# Patient Record
Sex: FEMALE | Race: BLACK OR AFRICAN AMERICAN | NOT HISPANIC OR LATINO
[De-identification: names, ages, dates, MRNs, and addresses within clinical notes are randomized per-mention and may not be internally consistent; named-entity substitution may affect disease eponyms.]

---

## 2018-05-21 ENCOUNTER — APPOINTMENT (OUTPATIENT)
Dept: INTERNAL MEDICINE | Facility: CLINIC | Age: 63
End: 2018-05-21

## 2019-04-15 ENCOUNTER — APPOINTMENT (OUTPATIENT)
Dept: INTERNAL MEDICINE | Facility: CLINIC | Age: 64
End: 2019-04-15

## 2022-10-10 ENCOUNTER — NON-APPOINTMENT (OUTPATIENT)
Age: 67
End: 2022-10-10

## 2022-10-10 ENCOUNTER — APPOINTMENT (OUTPATIENT)
Dept: INTERNAL MEDICINE | Facility: CLINIC | Age: 67
End: 2022-10-10

## 2022-10-10 VITALS
RESPIRATION RATE: 16 BRPM | TEMPERATURE: 98 F | OXYGEN SATURATION: 99 % | DIASTOLIC BLOOD PRESSURE: 88 MMHG | WEIGHT: 206 LBS | BODY MASS INDEX: 33.11 KG/M2 | HEART RATE: 70 BPM | HEIGHT: 66 IN | SYSTOLIC BLOOD PRESSURE: 158 MMHG

## 2022-10-10 DIAGNOSIS — M19.90 UNSPECIFIED OSTEOARTHRITIS, UNSPECIFIED SITE: ICD-10-CM

## 2022-10-10 DIAGNOSIS — M79.2 NEURALGIA AND NEURITIS, UNSPECIFIED: ICD-10-CM

## 2022-10-10 DIAGNOSIS — Z00.00 ENCOUNTER FOR GENERAL ADULT MEDICAL EXAMINATION W/OUT ABNORMAL FINDINGS: ICD-10-CM

## 2022-10-10 PROCEDURE — 99387 INIT PM E/M NEW PAT 65+ YRS: CPT | Mod: 25

## 2022-10-10 PROCEDURE — 90677 PCV20 VACCINE IM: CPT

## 2022-10-10 PROCEDURE — G0009: CPT

## 2022-10-10 PROCEDURE — 93000 ELECTROCARDIOGRAM COMPLETE: CPT

## 2022-10-10 NOTE — HEALTH RISK ASSESSMENT
[Good] : ~his/her~  mood as  good [Never] : Never [No] : In the past 12 months have you used drugs other than those required for medical reasons? No [No falls in past year] : Patient reported no falls in the past year [0] : 2) Feeling down, depressed, or hopeless: Not at all (0) [Patient reported mammogram was normal] : Patient reported mammogram was normal [Patient declined colonoscopy] : Patient declined colonoscopy [HIV test declined] : HIV test declined [Hepatitis C test offered] : Hepatitis C test offered [None] : None [Feels Safe at Home] : Feels safe at home [Fully functional (bathing, dressing, toileting, transferring, walking, feeding)] : Fully functional (bathing, dressing, toileting, transferring, walking, feeding) [Fully functional (using the telephone, shopping, preparing meals, housekeeping, doing laundry, using] : Fully functional and needs no help or supervision to perform IADLs (using the telephone, shopping, preparing meals, housekeeping, doing laundry, using transportation, managing medications and managing finances) [Smoke Detector] : smoke detector [Carbon Monoxide Detector] : carbon monoxide detector [Seat Belt] :  uses seat belt [Sunscreen] : uses sunscreen [With Patient/Caregiver] : , with patient/caregiver [FreeTextEntry1] : Check up\par  [de-identified] : None [[id number]] : 0 [Change in mental status noted] : No change in mental status noted [Reports changes in hearing] : Reports no changes in hearing [Reports changes in vision] : Reports no changes in vision [Reports changes in dental health] : Reports no changes in dental health [MammogramDate] : 02/16 [MammogramComments] : As ordered for today. [BoneDensityComments] : As ordered for today. [AdvancecareDate] : 10/22

## 2022-10-10 NOTE — ASSESSMENT
[FreeTextEntry1] : 67 year old female found to have stable Essential Hypertension, Hypercholesterolemia, Elevated Hemoglobin A1c, Microalbuminuria, Anemia, Eczema,with the current prescription regimen as recommended, diet and life style modifications, as counseled. Prior results reviewed, interpreted and discussed with the patient during today's examination, as appropriate. Follow up, treatment plan and tests, as ordered.\par \par EKG:\par Sinus Rhythm @ 67 BPM.\par Known LAD, no new ST-T changes noted [FreeTextEntry3] : low-cholesterol diet, no concentrated sugar diet as instructed.

## 2022-10-10 NOTE — HISTORY OF PRESENT ILLNESS
[de-identified] : 67 year old female patient with history of stable Essential Hypertension, Hypercholesterolemia, Elevated Hemoglobin A1c, Microalbuminuria, Anemia, Eczema, history as stated, presented for an annual preventative examination.\par Patient denies any associated symptoms of shortness of breath, chest pain, abdominal pain at this time.\par

## 2022-10-14 LAB
ALBUMIN SERPL ELPH-MCNC: 4.8 G/DL
ALP BLD-CCNC: 59 U/L
ALT SERPL-CCNC: 16 U/L
AMPHET UR-MCNC: NEGATIVE
ANION GAP SERPL CALC-SCNC: 12 MMOL/L
APPEARANCE: CLEAR
AST SERPL-CCNC: 31 U/L
BACTERIA: NEGATIVE
BARBITURATES UR-MCNC: NEGATIVE
BASOPHILS # BLD AUTO: 0.04 K/UL
BASOPHILS NFR BLD AUTO: 0.7 %
BENZODIAZ UR-MCNC: NEGATIVE
BILIRUB SERPL-MCNC: 0.2 MG/DL
BILIRUBIN URINE: NEGATIVE
BLOOD URINE: NEGATIVE
BUN SERPL-MCNC: 19 MG/DL
CALCIUM SERPL-MCNC: 9.3 MG/DL
CHLORIDE SERPL-SCNC: 104 MMOL/L
CHOLEST SERPL-MCNC: 204 MG/DL
CO2 SERPL-SCNC: 26 MMOL/L
COCAINE METAB.OTHER UR-MCNC: NEGATIVE
COLOR: NORMAL
CREAT SERPL-MCNC: 0.99 MG/DL
CREAT SPEC-SCNC: 95 MG/DL
CREATININE, URINE: 91 MG/DL
EGFR: 63 ML/MIN/1.73M2
EOSINOPHIL # BLD AUTO: 0.14 K/UL
EOSINOPHIL NFR BLD AUTO: 2.5 %
ESTIMATED AVERAGE GLUCOSE: 140 MG/DL
FOLATE SERPL-MCNC: >20 NG/ML
GGT SERPL-CCNC: 13 U/L
GLUCOSE QUALITATIVE U: NEGATIVE
GLUCOSE SERPL-MCNC: 78 MG/DL
HBA1C MFR BLD HPLC: 6.5 %
HCT VFR BLD CALC: 32.1 %
HCV AB SER QL: NONREACTIVE
HCV S/CO RATIO: 0.13 S/CO
HDLC SERPL-MCNC: 101 MG/DL
HGB BLD-MCNC: 10 G/DL
HYALINE CASTS: 3 /LPF
IMM GRANULOCYTES NFR BLD AUTO: 0.2 %
IRON SATN MFR SERPL: 13 %
IRON SERPL-MCNC: 53 UG/DL
KETONES URINE: NEGATIVE
LDLC SERPL CALC-MCNC: 92 MG/DL
LEUKOCYTE ESTERASE URINE: ABNORMAL
LYMPHOCYTES # BLD AUTO: 1.81 K/UL
LYMPHOCYTES NFR BLD AUTO: 32.8 %
M TB IFN-G BLD-IMP: NEGATIVE
MAN DIFF?: NORMAL
MCHC RBC-ENTMCNC: 26.7 PG
MCHC RBC-ENTMCNC: 31.2 GM/DL
MCV RBC AUTO: 85.6 FL
METHADONE UR-MCNC: NEGATIVE
METHAQUALONE UR-MCNC: NEGATIVE
MEV IGG FLD QL IA: >300 AU/ML
MEV IGG+IGM SER-IMP: POSITIVE
MICROALBUMIN 24H UR DL<=1MG/L-MCNC: 16.2 MG/DL
MICROALBUMIN/CREAT 24H UR-RTO: 171 MG/G
MICROSCOPIC-UA: NORMAL
MONOCYTES # BLD AUTO: 0.47 K/UL
MONOCYTES NFR BLD AUTO: 8.5 %
MUV AB SER-ACNC: POSITIVE
MUV IGG SER QL IA: 72.9 AU/ML
NEUTROPHILS # BLD AUTO: 3.05 K/UL
NEUTROPHILS NFR BLD AUTO: 55.3 %
NITRITE URINE: NEGATIVE
NONHDLC SERPL-MCNC: 103 MG/DL
OPIATES UR-MCNC: NEGATIVE
PCP UR-MCNC: NEGATIVE
PH URINE: 6.5
PLATELET # BLD AUTO: 323 K/UL
POTASSIUM SERPL-SCNC: 4.8 MMOL/L
PROPOXYPH UR QL: NEGATIVE
PROT SERPL-MCNC: 7.4 G/DL
PROTEIN URINE: ABNORMAL
QUANTIFERON TB PLUS MITOGEN MINUS NIL: >10 IU/ML
QUANTIFERON TB PLUS NIL: 0.05 IU/ML
QUANTIFERON TB PLUS TB1 MINUS NIL: 0.02 IU/ML
QUANTIFERON TB PLUS TB2 MINUS NIL: 0.01 IU/ML
RBC # BLD: 3.75 M/UL
RBC # FLD: 17.3 %
RED BLOOD CELLS URINE: 1 /HPF
RUBV IGG FLD-ACNC: 20.1 INDEX
RUBV IGG SER-IMP: POSITIVE
SODIUM SERPL-SCNC: 143 MMOL/L
SPECIFIC GRAVITY URINE: 1.01
SQUAMOUS EPITHELIAL CELLS: 5 /HPF
THC UR QL: NEGATIVE
TIBC SERPL-MCNC: 399 UG/DL
TRIGL SERPL-MCNC: 54 MG/DL
TSH SERPL-ACNC: 0.73 UIU/ML
UIBC SERPL-MCNC: 346 UG/DL
UROBILINOGEN URINE: NORMAL
VIT B12 SERPL-MCNC: 1616 PG/ML
VZV AB TITR SER: POSITIVE
VZV IGG SER IF-ACNC: 959.1 INDEX
WBC # FLD AUTO: 5.52 K/UL
WHITE BLOOD CELLS URINE: 20 /HPF

## 2022-10-15 LAB
HGB A MFR BLD: 62.7 %
HGB A2 MFR BLD: 3.3 %
HGB FRACT BLD-IMP: NORMAL
HGB S BLD QL SOLY: POSITIVE
HGB S MFR BLD: 34 %

## 2022-11-09 LAB — HEMOCCULT STL QL IA: NEGATIVE

## 2023-01-09 ENCOUNTER — APPOINTMENT (OUTPATIENT)
Dept: INTERNAL MEDICINE | Facility: CLINIC | Age: 68
End: 2023-01-09

## 2023-01-16 ENCOUNTER — RX ONLY (RX ONLY)
Age: 68
End: 2023-01-16

## 2023-01-16 ENCOUNTER — OFFICE (OUTPATIENT)
Dept: URBAN - METROPOLITAN AREA CLINIC 28 | Facility: CLINIC | Age: 68
Setting detail: OPHTHALMOLOGY
End: 2023-01-16
Payer: COMMERCIAL

## 2023-01-16 VITALS — WEIGHT: 198 LBS | HEIGHT: 66 IN

## 2023-01-16 DIAGNOSIS — H40.1111: ICD-10-CM

## 2023-01-16 DIAGNOSIS — T15.01XA: ICD-10-CM

## 2023-01-16 DIAGNOSIS — H40.1122: ICD-10-CM

## 2023-01-16 DIAGNOSIS — H25.13: ICD-10-CM

## 2023-01-16 DIAGNOSIS — H16.223: ICD-10-CM

## 2023-01-16 PROCEDURE — 65222 REMOVE FOREIGN BODY FROM EYE: CPT | Performed by: OPHTHALMOLOGY

## 2023-01-16 PROCEDURE — 92083 EXTENDED VISUAL FIELD XM: CPT | Performed by: OPHTHALMOLOGY

## 2023-01-16 PROCEDURE — 92020 GONIOSCOPY: CPT | Performed by: OPHTHALMOLOGY

## 2023-01-16 PROCEDURE — 92012 INTRM OPH EXAM EST PATIENT: CPT | Performed by: OPHTHALMOLOGY

## 2023-01-16 ASSESSMENT — AXIALLENGTH_DERIVED
OS_AL: 24.1022
OS_AL: 24.5158
OD_AL: 23.7953
OD_AL: 23.8948

## 2023-01-16 ASSESSMENT — KERATOMETRY
OD_K2POWER_DIOPTERS: 42.00
OS_K1POWER_DIOPTERS: 41.50
OS_AXISANGLE_DEGREES: 139
OS_K2POWER_DIOPTERS: 42.25
OD_AXISANGLE_DEGREES: 019
OD_K1POWER_DIOPTERS: 41.25

## 2023-01-16 ASSESSMENT — REFRACTION_CURRENTRX
OD_CYLINDER: -0.50
OD_ADD: +2.50
OD_VPRISM_DIRECTION: PROGS
OS_VPRISM_DIRECTION: PROGS
OS_ADD: +2.50
OS_CYLINDER: -1.00
OD_AXIS: 103
OD_OVR_VA: 20/
OD_SPHERE: +1.50
OS_SPHERE: +0.25
OS_OVR_VA: 20/
OS_AXIS: 084

## 2023-01-16 ASSESSMENT — REFRACTION_MANIFEST
OD_SPHERE: +1.50
OD_ADD: +2.50
OS_ADD: +2.50
OS_SPHERE: -0.25
OS_VA1: 20/30+
OD_AXIS: 100
OS_AXIS: 090
OD_VA1: 20/25+
OS_CYLINDER: -1.00
OD_CYLINDER: -1.00

## 2023-01-16 ASSESSMENT — SPHEQUIV_DERIVED
OS_SPHEQUIV: 0.25
OD_SPHEQUIV: 1.25
OD_SPHEQUIV: 1
OS_SPHEQUIV: -0.75

## 2023-01-16 ASSESSMENT — VISUAL ACUITY: OS_BCVA: 20/30-2

## 2023-01-16 ASSESSMENT — PACHYMETRY
OD_CT_UM: 523
OS_CT_UM: 528
OS_CT_CORRECTION: 1
OD_CT_CORRECTION: 1

## 2023-01-16 ASSESSMENT — CONFRONTATIONAL VISUAL FIELD TEST (CVF)
OS_FINDINGS: FULL
OD_FINDINGS: FULL

## 2023-01-16 ASSESSMENT — CORNEAL PTERYGIUM: OS_PTERYGIUM: NASAL

## 2023-01-16 ASSESSMENT — REFRACTION_AUTOREFRACTION
OS_CYLINDER: -1.50
OD_AXIS: 095
OS_SPHERE: +1.00
OD_CYLINDER: -1.50
OD_SPHERE: +2.00
OS_AXIS: 083

## 2023-01-16 ASSESSMENT — CORNEAL TRAUMA - FOREIGN BODY: OD_FOREIGNBODY: METALLIC W/RUST RUN PRESENT

## 2023-01-16 ASSESSMENT — LID EXAM ASSESSMENTS
OS_MEIBOMITIS: T
OD_MEIBOMITIS: T

## 2023-01-16 ASSESSMENT — TEAR BREAK UP TIME (TBUT)
OS_TBUT: T
OD_TBUT: T

## 2023-01-16 ASSESSMENT — TONOMETRY
OS_IOP_MMHG: 11
OD_IOP_MMHG: 10

## 2023-02-08 ENCOUNTER — APPOINTMENT (OUTPATIENT)
Dept: NEUROLOGY | Facility: CLINIC | Age: 68
End: 2023-02-08

## 2023-02-13 ENCOUNTER — OFFICE (OUTPATIENT)
Dept: URBAN - METROPOLITAN AREA CLINIC 28 | Facility: CLINIC | Age: 68
Setting detail: OPHTHALMOLOGY
End: 2023-02-13
Payer: COMMERCIAL

## 2023-02-13 DIAGNOSIS — H25.13: ICD-10-CM

## 2023-02-13 DIAGNOSIS — H11.042: ICD-10-CM

## 2023-02-13 DIAGNOSIS — T15.01XD: ICD-10-CM

## 2023-02-13 DIAGNOSIS — H16.223: ICD-10-CM

## 2023-02-13 DIAGNOSIS — H40.1111: ICD-10-CM

## 2023-02-13 DIAGNOSIS — H40.1122: ICD-10-CM

## 2023-02-13 PROCEDURE — 92012 INTRM OPH EXAM EST PATIENT: CPT | Performed by: OPHTHALMOLOGY

## 2023-02-13 ASSESSMENT — REFRACTION_MANIFEST
OS_CYLINDER: -1.00
OS_SPHERE: -0.25
OS_ADD: +2.50
OD_AXIS: 100
OD_CYLINDER: -1.00
OD_SPHERE: +1.50
OD_ADD: +2.50
OS_VA1: 20/30+
OS_AXIS: 090
OD_VA1: 20/25+

## 2023-02-13 ASSESSMENT — REFRACTION_CURRENTRX
OS_ADD: +2.50
OD_OVR_VA: 20/
OD_SPHERE: +1.50
OS_VPRISM_DIRECTION: PROGS
OD_CYLINDER: -0.50
OS_OVR_VA: 20/
OD_VPRISM_DIRECTION: PROGS
OS_CYLINDER: -1.00
OD_ADD: +2.50
OS_SPHERE: +0.25
OS_AXIS: 084
OD_AXIS: 103

## 2023-02-13 ASSESSMENT — REFRACTION_AUTOREFRACTION
OD_CYLINDER: -1.00
OS_CYLINDER: -1.25
OD_SPHERE: +1.50
OS_SPHERE: +0.25
OS_AXIS: 087
OD_AXIS: 102

## 2023-02-13 ASSESSMENT — SPHEQUIV_DERIVED
OS_SPHEQUIV: -0.375
OD_SPHEQUIV: 1
OD_SPHEQUIV: 1
OS_SPHEQUIV: -0.75

## 2023-02-13 ASSESSMENT — KERATOMETRY
OS_AXISANGLE_DEGREES: 152
OS_K1POWER_DIOPTERS: 41.75
OD_AXISANGLE_DEGREES: 147
OS_K2POWER_DIOPTERS: 42.25
OD_K2POWER_DIOPTERS: 43.75
OD_K1POWER_DIOPTERS: 43.51

## 2023-02-13 ASSESSMENT — AXIALLENGTH_DERIVED
OD_AL: 23.16
OD_AL: 23.16
OS_AL: 24.3102
OS_AL: 24.4663

## 2023-02-13 ASSESSMENT — PACHYMETRY
OD_CT_UM: 523
OS_CT_UM: 528
OS_CT_CORRECTION: 1
OD_CT_CORRECTION: 1

## 2023-02-13 ASSESSMENT — SUPERFICIAL PUNCTATE KERATITIS (SPK)
OD_SPK: 1+
OS_SPK: 1+

## 2023-02-13 ASSESSMENT — TONOMETRY
OS_IOP_MMHG: 15
OD_IOP_MMHG: 14

## 2023-02-13 ASSESSMENT — LID EXAM ASSESSMENTS
OD_MEIBOMITIS: T
OS_MEIBOMITIS: T

## 2023-02-13 ASSESSMENT — CORNEAL PTERYGIUM: OS_PTERYGIUM: NASAL

## 2023-02-13 ASSESSMENT — CORNEAL TRAUMA - FOREIGN BODY: OD_FOREIGNBODY: ABSENT

## 2023-02-13 ASSESSMENT — CORNEAL SURGICAL SCARRING: OD_SCARRING: ANTERIOR STROMAL

## 2023-02-13 ASSESSMENT — VISUAL ACUITY
OS_BCVA: 20/30
OD_BCVA: 20/20

## 2023-02-13 ASSESSMENT — TEAR BREAK UP TIME (TBUT)
OS_TBUT: T
OD_TBUT: T

## 2023-02-20 ENCOUNTER — OFFICE (OUTPATIENT)
Dept: URBAN - METROPOLITAN AREA CLINIC 28 | Facility: CLINIC | Age: 68
Setting detail: OPHTHALMOLOGY
End: 2023-02-20
Payer: COMMERCIAL

## 2023-02-20 DIAGNOSIS — H40.1122: ICD-10-CM

## 2023-02-20 DIAGNOSIS — H16.223: ICD-10-CM

## 2023-02-20 DIAGNOSIS — H40.1111: ICD-10-CM

## 2023-02-20 DIAGNOSIS — H11.042: ICD-10-CM

## 2023-02-20 DIAGNOSIS — H25.13: ICD-10-CM

## 2023-02-20 PROBLEM — T15.01XD CORNEAL FOREIGN BODY; RIGHT EYE SUBSEQUENT ENCOUNTER: Status: RESOLVED | Noted: 2023-02-13 | Resolved: 2023-02-20

## 2023-02-20 PROCEDURE — 92012 INTRM OPH EXAM EST PATIENT: CPT | Performed by: OPHTHALMOLOGY

## 2023-02-20 PROCEDURE — 66030 INJECTION TREATMENT OF EYE: CPT | Performed by: OPHTHALMOLOGY

## 2023-02-20 ASSESSMENT — SUPERFICIAL PUNCTATE KERATITIS (SPK)
OS_SPK: 1+
OD_SPK: 1+

## 2023-02-20 ASSESSMENT — PACHYMETRY
OD_CT_CORRECTION: 1
OS_CT_CORRECTION: 1
OD_CT_UM: 523
OS_CT_UM: 528

## 2023-02-20 ASSESSMENT — CORNEAL PTERYGIUM: OS_PTERYGIUM: NASAL

## 2023-02-20 ASSESSMENT — CONFRONTATIONAL VISUAL FIELD TEST (CVF)
OD_FINDINGS: FULL
OS_FINDINGS: FULL

## 2023-02-20 ASSESSMENT — TONOMETRY
OD_IOP_MMHG: 12
OS_IOP_MMHG: 12

## 2023-02-20 ASSESSMENT — LID EXAM ASSESSMENTS
OD_MEIBOMITIS: T
OS_MEIBOMITIS: T

## 2023-02-20 ASSESSMENT — CORNEAL SURGICAL SCARRING: OD_SCARRING: ANTERIOR STROMAL

## 2023-02-20 ASSESSMENT — TEAR BREAK UP TIME (TBUT)
OD_TBUT: T
OS_TBUT: T

## 2023-02-20 ASSESSMENT — CORNEAL TRAUMA - FOREIGN BODY: OD_FOREIGNBODY: ABSENT

## 2023-02-21 ASSESSMENT — REFRACTION_CURRENTRX
OD_VPRISM_DIRECTION: PROGS
OD_AXIS: 103
OD_ADD: +2.50
OS_ADD: +2.50
OS_SPHERE: +0.25
OD_CYLINDER: -0.50
OS_OVR_VA: 20/
OS_AXIS: 084
OS_CYLINDER: -1.00
OD_OVR_VA: 20/
OD_SPHERE: +1.50
OS_VPRISM_DIRECTION: PROGS

## 2023-02-21 ASSESSMENT — AXIALLENGTH_DERIVED
OS_AL: 24.4663
OD_AL: 23.16
OS_AL: 24.3102
OD_AL: 23.16

## 2023-02-21 ASSESSMENT — REFRACTION_MANIFEST
OS_SPHERE: -0.25
OD_CYLINDER: -1.00
OD_SPHERE: +1.50
OD_ADD: +2.50
OD_AXIS: 100
OD_VA1: 20/25+
OS_ADD: +2.50
OS_VA1: 20/30+
OS_AXIS: 090
OS_CYLINDER: -1.00

## 2023-02-21 ASSESSMENT — REFRACTION_AUTOREFRACTION
OD_CYLINDER: -1.00
OS_AXIS: 087
OD_SPHERE: +1.50
OS_CYLINDER: -1.25
OS_SPHERE: +0.25
OD_AXIS: 102

## 2023-02-21 ASSESSMENT — SPHEQUIV_DERIVED
OS_SPHEQUIV: -0.375
OD_SPHEQUIV: 1
OS_SPHEQUIV: -0.75
OD_SPHEQUIV: 1

## 2023-02-21 ASSESSMENT — KERATOMETRY
OS_K1POWER_DIOPTERS: 41.75
OS_AXISANGLE_DEGREES: 152
OD_K2POWER_DIOPTERS: 43.75
OD_K1POWER_DIOPTERS: 43.51
OS_K2POWER_DIOPTERS: 42.25
OD_AXISANGLE_DEGREES: 147

## 2023-02-21 ASSESSMENT — VISUAL ACUITY
OS_BCVA: 20/20-2
OD_BCVA: 20/20-2

## 2023-02-27 ENCOUNTER — OFFICE (OUTPATIENT)
Dept: URBAN - METROPOLITAN AREA CLINIC 28 | Facility: CLINIC | Age: 68
Setting detail: OPHTHALMOLOGY
End: 2023-02-27
Payer: COMMERCIAL

## 2023-02-27 DIAGNOSIS — H40.1111: ICD-10-CM

## 2023-02-27 DIAGNOSIS — H25.13: ICD-10-CM

## 2023-02-27 DIAGNOSIS — H16.223: ICD-10-CM

## 2023-02-27 DIAGNOSIS — H11.042: ICD-10-CM

## 2023-02-27 PROCEDURE — 66030 INJECTION TREATMENT OF EYE: CPT | Performed by: OPHTHALMOLOGY

## 2023-02-27 PROCEDURE — 92012 INTRM OPH EXAM EST PATIENT: CPT | Performed by: OPHTHALMOLOGY

## 2023-02-27 ASSESSMENT — LID EXAM ASSESSMENTS
OD_MEIBOMITIS: T
OS_MEIBOMITIS: T

## 2023-02-27 ASSESSMENT — CORNEAL PTERYGIUM: OS_PTERYGIUM: NASAL

## 2023-02-27 ASSESSMENT — SUPERFICIAL PUNCTATE KERATITIS (SPK)
OS_SPK: T
OD_SPK: T

## 2023-02-27 ASSESSMENT — TEAR BREAK UP TIME (TBUT)
OS_TBUT: T
OD_TBUT: T

## 2023-02-27 ASSESSMENT — CONFRONTATIONAL VISUAL FIELD TEST (CVF)
OS_FINDINGS: FULL
OD_FINDINGS: FULL

## 2023-02-27 ASSESSMENT — CORNEAL TRAUMA - FOREIGN BODY: OD_FOREIGNBODY: ABSENT

## 2023-02-27 ASSESSMENT — CORNEAL SURGICAL SCARRING: OD_SCARRING: ANTERIOR STROMAL

## 2023-02-28 ASSESSMENT — REFRACTION_CURRENTRX
OD_SPHERE: +1.50
OS_AXIS: 084
OS_SPHERE: +0.25
OD_CYLINDER: -0.50
OD_AXIS: 103
OD_OVR_VA: 20/
OS_OVR_VA: 20/
OS_CYLINDER: -1.00
OD_VPRISM_DIRECTION: PROGS
OD_ADD: +2.50
OS_VPRISM_DIRECTION: PROGS
OS_ADD: +2.50

## 2023-02-28 ASSESSMENT — REFRACTION_MANIFEST
OD_ADD: +2.50
OS_CYLINDER: -1.00
OS_ADD: +2.50
OS_SPHERE: -0.25
OD_CYLINDER: -1.00
OD_VA1: 20/25+
OD_SPHERE: +1.50
OS_VA1: 20/30+
OD_AXIS: 100
OS_AXIS: 090

## 2023-02-28 ASSESSMENT — SPHEQUIV_DERIVED
OD_SPHEQUIV: 1
OS_SPHEQUIV: -0.75
OD_SPHEQUIV: 1
OS_SPHEQUIV: -0.375

## 2023-02-28 ASSESSMENT — REFRACTION_AUTOREFRACTION
OS_SPHERE: +0.25
OS_AXIS: 087
OS_CYLINDER: -1.25
OD_SPHERE: +1.50
OD_AXIS: 102
OD_CYLINDER: -1.00

## 2023-02-28 ASSESSMENT — VISUAL ACUITY
OD_BCVA: 20/20-2
OS_BCVA: 20/20-2

## 2023-04-10 ENCOUNTER — OFFICE (OUTPATIENT)
Dept: URBAN - METROPOLITAN AREA CLINIC 28 | Facility: CLINIC | Age: 68
Setting detail: OPHTHALMOLOGY
End: 2023-04-10
Payer: COMMERCIAL

## 2023-04-10 ENCOUNTER — RX ONLY (RX ONLY)
Age: 68
End: 2023-04-10

## 2023-04-10 DIAGNOSIS — H11.042: ICD-10-CM

## 2023-04-10 DIAGNOSIS — H16.223: ICD-10-CM

## 2023-04-10 DIAGNOSIS — H25.13: ICD-10-CM

## 2023-04-10 DIAGNOSIS — H40.1122: ICD-10-CM

## 2023-04-10 DIAGNOSIS — H40.1111: ICD-10-CM

## 2023-04-10 PROCEDURE — 92014 COMPRE OPH EXAM EST PT 1/>: CPT | Performed by: OPHTHALMOLOGY

## 2023-04-10 PROCEDURE — 92133 CPTRZD OPH DX IMG PST SGM ON: CPT | Performed by: OPHTHALMOLOGY

## 2023-04-10 ASSESSMENT — KERATOMETRY
OD_AXISANGLE_DEGREES: 023
OS_AXISANGLE_DEGREES: 141
OD_K1POWER_DIOPTERS: 41.25
OS_K1POWER_DIOPTERS: 41.25
OS_K2POWER_DIOPTERS: 42.00
OD_K2POWER_DIOPTERS: 41.75

## 2023-04-10 ASSESSMENT — LID EXAM ASSESSMENTS
OS_MEIBOMITIS: T
OD_MEIBOMITIS: T

## 2023-04-10 ASSESSMENT — AXIALLENGTH_DERIVED
OD_AL: 23.9923
OS_AL: 24.3531
OS_AL: 24.6154
OD_AL: 23.942

## 2023-04-10 ASSESSMENT — REFRACTION_CURRENTRX
OD_AXIS: 103
OS_AXIS: 084
OD_SPHERE: +1.50
OD_CYLINDER: -0.50
OS_OVR_VA: 20/
OD_OVR_VA: 20/
OS_SPHERE: +0.25
OD_VPRISM_DIRECTION: PROGS
OD_ADD: +2.50
OS_ADD: +2.50
OS_VPRISM_DIRECTION: PROGS
OS_CYLINDER: -1.00

## 2023-04-10 ASSESSMENT — SPHEQUIV_DERIVED
OS_SPHEQUIV: -0.75
OS_SPHEQUIV: -0.125
OD_SPHEQUIV: 1
OD_SPHEQUIV: 0.875

## 2023-04-10 ASSESSMENT — REFRACTION_MANIFEST
OD_VA1: 20/25+
OS_CYLINDER: -1.00
OD_ADD: +2.50
OD_CYLINDER: -1.00
OD_SPHERE: +1.50
OS_AXIS: 090
OS_SPHERE: -0.25
OD_AXIS: 100
OS_VA1: 20/30+
OS_ADD: +2.50

## 2023-04-10 ASSESSMENT — VISUAL ACUITY
OS_BCVA: 20/20-2
OD_BCVA: 20/20-2

## 2023-04-10 ASSESSMENT — REFRACTION_AUTOREFRACTION
OS_CYLINDER: -1.75
OS_SPHERE: +0.75
OD_AXIS: 098
OD_SPHERE: +1.25
OD_CYLINDER: -0.75
OS_AXIS: 077

## 2023-04-10 ASSESSMENT — TEAR BREAK UP TIME (TBUT)
OD_TBUT: T
OS_TBUT: T

## 2023-04-10 ASSESSMENT — PACHYMETRY
OD_CT_UM: 523
OD_CT_CORRECTION: 1
OS_CT_CORRECTION: 1
OS_CT_UM: 528

## 2023-04-10 ASSESSMENT — TONOMETRY
OS_IOP_MMHG: 12
OD_IOP_MMHG: 10

## 2023-04-10 ASSESSMENT — SUPERFICIAL PUNCTATE KERATITIS (SPK)
OD_SPK: T
OS_SPK: T

## 2023-04-10 ASSESSMENT — CORNEAL PTERYGIUM: OS_PTERYGIUM: NASAL

## 2023-04-10 ASSESSMENT — CONFRONTATIONAL VISUAL FIELD TEST (CVF)
OS_FINDINGS: FULL
OD_FINDINGS: FULL

## 2023-04-10 ASSESSMENT — CORNEAL SURGICAL SCARRING: OD_SCARRING: ANTERIOR STROMAL

## 2023-07-13 ENCOUNTER — Encounter (OUTPATIENT)
Dept: URBAN - METROPOLITAN AREA CLINIC 28 | Facility: CLINIC | Age: 68
Setting detail: OPHTHALMOLOGY
End: 2023-07-13
Payer: MEDICARE

## 2023-07-22 ENCOUNTER — RX RENEWAL (OUTPATIENT)
Age: 68
End: 2023-07-22

## 2023-07-24 ENCOUNTER — OFFICE (OUTPATIENT)
Dept: URBAN - METROPOLITAN AREA CLINIC 28 | Facility: CLINIC | Age: 68
Setting detail: OPHTHALMOLOGY
End: 2023-07-24
Payer: MEDICARE

## 2023-07-24 DIAGNOSIS — H40.1122: ICD-10-CM

## 2023-07-24 DIAGNOSIS — H40.1111: ICD-10-CM

## 2023-07-24 DIAGNOSIS — H11.042: ICD-10-CM

## 2023-07-24 DIAGNOSIS — H25.13: ICD-10-CM

## 2023-07-24 PROCEDURE — 92012 INTRM OPH EXAM EST PATIENT: CPT | Performed by: OPHTHALMOLOGY

## 2023-07-24 ASSESSMENT — REFRACTION_MANIFEST
OS_AXIS: 090
OD_SPHERE: +1.50
OD_SPHERE: +1.50
OD_AXIS: 100
OS_VA1: 20/30+
OS_ADD: +2.50
OS_SPHERE: -0.25
OD_VA1: 20/25
OD_ADD: +2.50
OD_CYLINDER: -0.50
OS_CYLINDER: -1.00
OD_AXIS: 100
OD_CYLINDER: -1.00
OD_VA1: 20/25+

## 2023-07-24 ASSESSMENT — LID EXAM ASSESSMENTS
OS_MEIBOMITIS: T
OD_MEIBOMITIS: T

## 2023-07-24 ASSESSMENT — AXIALLENGTH_DERIVED
OS_AL: 24.3531
OD_AL: 23.9923
OS_AL: 24.6154
OD_AL: 23.942
OD_AL: 23.8421

## 2023-07-24 ASSESSMENT — REFRACTION_AUTOREFRACTION
OD_AXIS: 098
OS_AXIS: 077
OS_CYLINDER: -1.75
OD_SPHERE: +1.25
OD_CYLINDER: -0.75
OS_SPHERE: +0.75

## 2023-07-24 ASSESSMENT — REFRACTION_CURRENTRX
OS_OVR_VA: 20/
OS_AXIS: 084
OD_CYLINDER: -0.50
OS_VPRISM_DIRECTION: PROGS
OS_CYLINDER: -1.00
OD_OVR_VA: 20/
OD_ADD: +2.50
OD_SPHERE: +1.50
OD_VPRISM_DIRECTION: PROGS
OS_SPHERE: +0.25
OS_ADD: +2.50
OD_AXIS: 103

## 2023-07-24 ASSESSMENT — SPHEQUIV_DERIVED
OS_SPHEQUIV: -0.75
OD_SPHEQUIV: 1.25
OD_SPHEQUIV: 0.875
OD_SPHEQUIV: 1
OS_SPHEQUIV: -0.125

## 2023-07-24 ASSESSMENT — SUPERFICIAL PUNCTATE KERATITIS (SPK)
OS_SPK: T
OD_SPK: T

## 2023-07-24 ASSESSMENT — VISUAL ACUITY
OD_BCVA: 20/25-2
OS_BCVA: 20/25-2

## 2023-07-24 ASSESSMENT — CORNEAL EDEMA - MICROCYSTIC EPITHELIAL EDEMA (MCE): OD_MCE: T

## 2023-07-24 ASSESSMENT — CORNEAL SURGICAL SCARRING: OD_SCARRING: ANTERIOR STROMAL

## 2023-07-24 ASSESSMENT — PACHYMETRY
OS_CT_CORRECTION: 1
OS_CT_UM: 528
OD_CT_UM: 523
OD_CT_CORRECTION: 1

## 2023-07-24 ASSESSMENT — TONOMETRY
OS_IOP_MMHG: 15
OD_IOP_MMHG: 13

## 2023-07-24 ASSESSMENT — TEAR BREAK UP TIME (TBUT)
OS_TBUT: T
OD_TBUT: T

## 2023-07-24 ASSESSMENT — KERATOMETRY
OS_AXISANGLE_DEGREES: 141
OD_AXISANGLE_DEGREES: 023
OS_K2POWER_DIOPTERS: 42.00
OD_K1POWER_DIOPTERS: 41.25
OD_K2POWER_DIOPTERS: 41.75
OS_K1POWER_DIOPTERS: 41.25

## 2023-07-24 ASSESSMENT — CORNEAL PTERYGIUM: OS_PTERYGIUM: NASAL

## 2023-08-23 ENCOUNTER — OFFICE (OUTPATIENT)
Dept: URBAN - METROPOLITAN AREA CLINIC 28 | Facility: CLINIC | Age: 68
Setting detail: OPHTHALMOLOGY
End: 2023-08-23
Payer: MEDICARE

## 2023-08-23 DIAGNOSIS — H40.1111: ICD-10-CM

## 2023-08-23 DIAGNOSIS — H25.13: ICD-10-CM

## 2023-08-23 DIAGNOSIS — H40.1122: ICD-10-CM

## 2023-08-23 PROCEDURE — 92012 INTRM OPH EXAM EST PATIENT: CPT | Performed by: OPHTHALMOLOGY

## 2023-08-23 ASSESSMENT — PACHYMETRY
OS_CT_CORRECTION: 1
OD_CT_UM: 523
OS_CT_UM: 528
OD_CT_CORRECTION: 1

## 2023-08-23 ASSESSMENT — CORNEAL SURGICAL SCARRING: OD_SCARRING: ANTERIOR STROMAL

## 2023-08-23 ASSESSMENT — TEAR BREAK UP TIME (TBUT)
OS_TBUT: T
OD_TBUT: T

## 2023-08-23 ASSESSMENT — SUPERFICIAL PUNCTATE KERATITIS (SPK)
OD_SPK: T
OS_SPK: T

## 2023-08-23 ASSESSMENT — REFRACTION_MANIFEST
OS_ADD: +2.50
OD_CYLINDER: -1.00
OD_SPHERE: +1.50
OS_SPHERE: -0.25
OD_SPHERE: +1.50
OD_AXIS: 100
OS_VA1: 20/30+
OS_AXIS: 090
OD_VA1: 20/25
OD_CYLINDER: -0.50
OD_ADD: +2.50
OD_AXIS: 100
OD_VA1: 20/25+
OS_CYLINDER: -1.00

## 2023-08-23 ASSESSMENT — REFRACTION_CURRENTRX
OD_AXIS: 103
OS_AXIS: 084
OS_ADD: +2.50
OD_CYLINDER: -0.50
OS_VPRISM_DIRECTION: PROGS
OS_SPHERE: +0.25
OD_SPHERE: +1.50
OD_OVR_VA: 20/
OS_CYLINDER: -1.00
OD_ADD: +2.50
OS_OVR_VA: 20/
OD_VPRISM_DIRECTION: PROGS

## 2023-08-23 ASSESSMENT — SPHEQUIV_DERIVED
OD_SPHEQUIV: 1.25
OD_SPHEQUIV: 0.875
OS_SPHEQUIV: -0.75
OD_SPHEQUIV: 1
OS_SPHEQUIV: -0.125

## 2023-08-23 ASSESSMENT — TONOMETRY
OD_IOP_MMHG: 15
OS_IOP_MMHG: 14

## 2023-08-23 ASSESSMENT — AXIALLENGTH_DERIVED
OD_AL: 23.9923
OS_AL: 24.3531
OD_AL: 23.8421
OS_AL: 24.6154
OD_AL: 23.942

## 2023-08-23 ASSESSMENT — KERATOMETRY
OS_K1POWER_DIOPTERS: 41.25
OD_K1POWER_DIOPTERS: 41.25
OS_AXISANGLE_DEGREES: 141
OD_AXISANGLE_DEGREES: 023
OD_K2POWER_DIOPTERS: 41.75
OS_K2POWER_DIOPTERS: 42.00

## 2023-08-23 ASSESSMENT — REFRACTION_AUTOREFRACTION
OS_CYLINDER: -1.75
OS_SPHERE: +0.75
OS_AXIS: 077
OD_AXIS: 098
OD_CYLINDER: -0.75
OD_SPHERE: +1.25

## 2023-08-23 ASSESSMENT — CONFRONTATIONAL VISUAL FIELD TEST (CVF)
OS_FINDINGS: FULL
OD_FINDINGS: FULL

## 2023-08-23 ASSESSMENT — VISUAL ACUITY
OD_BCVA: 20/25-2
OS_BCVA: 20/25-2

## 2023-08-23 ASSESSMENT — CORNEAL EDEMA - MICROCYSTIC EPITHELIAL EDEMA (MCE): OD_MCE: T

## 2023-08-23 ASSESSMENT — LID EXAM ASSESSMENTS
OD_MEIBOMITIS: T
OS_MEIBOMITIS: T

## 2023-08-23 ASSESSMENT — CORNEAL PTERYGIUM: OS_PTERYGIUM: NASAL

## 2023-08-31 ENCOUNTER — OFFICE (OUTPATIENT)
Dept: URBAN - METROPOLITAN AREA CLINIC 28 | Facility: CLINIC | Age: 68
Setting detail: OPHTHALMOLOGY
End: 2023-08-31
Payer: MEDICARE

## 2023-08-31 DIAGNOSIS — H40.1122: ICD-10-CM

## 2023-08-31 DIAGNOSIS — H25.13: ICD-10-CM

## 2023-08-31 DIAGNOSIS — H40.1111: ICD-10-CM

## 2023-08-31 PROCEDURE — 92012 INTRM OPH EXAM EST PATIENT: CPT | Performed by: OPHTHALMOLOGY

## 2023-08-31 ASSESSMENT — REFRACTION_CURRENTRX
OS_CYLINDER: -1.00
OS_ADD: +2.50
OS_CYLINDER: -1.00
OD_ADD: +2.50
OD_CYLINDER: -1.00
OS_SPHERE: +0.25
OD_AXIS: 103
OS_VPRISM_DIRECTION: PROGS
OD_AXIS: 100
OD_SPHERE: +1.50
OD_SPHERE: +1.50
OS_AXIS: 090
OS_SPHERE: -0.25
OS_ADD: +2.50
OS_AXIS: 084
OS_OVR_VA: 20/
OD_OVR_VA: 20/
OD_CYLINDER: -0.50
OD_VPRISM_DIRECTION: PROGS
OS_OVR_VA: 20/
OD_OVR_VA: 20/
OD_VPRISM_DIRECTION: PROGS
OD_ADD: +2.50
OS_VPRISM_DIRECTION: PROGS

## 2023-08-31 ASSESSMENT — VISUAL ACUITY
OD_BCVA: 20/20-1
OS_BCVA: 20/20-1

## 2023-08-31 ASSESSMENT — REFRACTION_MANIFEST
OS_ADD: +2.50
OS_AXIS: 090
OD_VA1: 20/25
OD_SPHERE: +1.50
OS_VA1: 20/30+
OS_SPHERE: -0.25
OD_CYLINDER: -0.50
OD_CYLINDER: -1.00
OD_AXIS: 100
OD_ADD: +2.50
OS_CYLINDER: -1.00
OD_AXIS: 100
OD_VA1: 20/25+
OD_SPHERE: +1.50

## 2023-08-31 ASSESSMENT — CONFRONTATIONAL VISUAL FIELD TEST (CVF)
OS_FINDINGS: FULL
OD_FINDINGS: FULL

## 2023-08-31 ASSESSMENT — SPHEQUIV_DERIVED
OD_SPHEQUIV: 1.125
OD_SPHEQUIV: 1.25
OS_SPHEQUIV: -0.75
OD_SPHEQUIV: 1
OS_SPHEQUIV: -0.375

## 2023-08-31 ASSESSMENT — PACHYMETRY
OD_CT_UM: 523
OD_CT_CORRECTION: 1
OS_CT_CORRECTION: 1
OS_CT_UM: 528

## 2023-08-31 ASSESSMENT — LID EXAM ASSESSMENTS
OS_MEIBOMITIS: T
OD_MEIBOMITIS: T

## 2023-08-31 ASSESSMENT — REFRACTION_AUTOREFRACTION
OD_SPHERE: +2.00
OD_CYLINDER: -1.75
OS_SPHERE: +0.25
OS_CYLINDER: -1.25
OD_AXIS: 092
OS_AXIS: 084

## 2023-08-31 ASSESSMENT — TEAR BREAK UP TIME (TBUT)
OD_TBUT: T
OS_TBUT: T

## 2023-08-31 ASSESSMENT — CORNEAL SURGICAL SCARRING: OD_SCARRING: ANTERIOR STROMAL

## 2023-08-31 ASSESSMENT — CORNEAL EDEMA - MICROCYSTIC EPITHELIAL EDEMA (MCE): OD_MCE: T

## 2023-08-31 ASSESSMENT — CORNEAL PTERYGIUM: OS_PTERYGIUM: NASAL

## 2023-08-31 ASSESSMENT — SUPERFICIAL PUNCTATE KERATITIS (SPK)
OS_SPK: T
OD_SPK: T

## 2023-08-31 ASSESSMENT — TONOMETRY
OD_IOP_MMHG: 12
OS_IOP_MMHG: 13

## 2023-09-11 ENCOUNTER — OFFICE (OUTPATIENT)
Dept: URBAN - METROPOLITAN AREA CLINIC 28 | Facility: CLINIC | Age: 68
Setting detail: OPHTHALMOLOGY
End: 2023-09-11
Payer: MEDICARE

## 2023-09-11 DIAGNOSIS — H16.223: ICD-10-CM

## 2023-09-11 PROCEDURE — 92012 INTRM OPH EXAM EST PATIENT: CPT | Performed by: OPHTHALMOLOGY

## 2023-09-11 ASSESSMENT — TEAR BREAK UP TIME (TBUT)
OS_TBUT: T
OD_TBUT: T

## 2023-09-11 ASSESSMENT — SUPERFICIAL PUNCTATE KERATITIS (SPK)
OD_SPK: ABSENT
OS_SPK: T

## 2023-09-11 ASSESSMENT — CONFRONTATIONAL VISUAL FIELD TEST (CVF)
OD_FINDINGS: FULL
OS_FINDINGS: FULL

## 2023-09-11 ASSESSMENT — PACHYMETRY
OD_CT_UM: 523
OD_CT_CORRECTION: 1
OS_CT_UM: 528
OS_CT_CORRECTION: 1

## 2023-09-11 ASSESSMENT — CORNEAL SURGICAL SCARRING: OD_SCARRING: ANTERIOR STROMAL

## 2023-09-11 ASSESSMENT — TONOMETRY
OD_IOP_MMHG: 11
OS_IOP_MMHG: 14

## 2023-09-11 ASSESSMENT — CORNEAL EDEMA - MICROCYSTIC EPITHELIAL EDEMA (MCE): OD_MCE: T

## 2023-09-11 ASSESSMENT — LID EXAM ASSESSMENTS
OD_MEIBOMITIS: T
OS_MEIBOMITIS: T

## 2023-09-11 ASSESSMENT — CORNEAL PTERYGIUM: OS_PTERYGIUM: NASAL

## 2023-09-12 ENCOUNTER — AMBULATORY SURGERY CENTER (OUTPATIENT)
Dept: URBAN - METROPOLITAN AREA CLINIC 75 | Facility: CLINIC | Age: 68
Setting detail: OPHTHALMOLOGY
End: 2023-09-12
Payer: MEDICARE

## 2023-09-12 DIAGNOSIS — H40.1111: ICD-10-CM

## 2023-09-12 PROCEDURE — 65920 REMOVE IMPLANT OF EYE: CPT | Performed by: OPHTHALMOLOGY

## 2023-09-12 ASSESSMENT — AXIALLENGTH_DERIVED
OS_AL: 24.359
OD_AL: 23.8948
OS_AL: 24.5158
OD_AL: 23.9449
OD_AL: 23.7953

## 2023-09-12 ASSESSMENT — SPHEQUIV_DERIVED
OD_SPHEQUIV: 1.25
OD_SPHEQUIV: 1
OS_SPHEQUIV: -0.375
OD_SPHEQUIV: 0.875
OS_SPHEQUIV: -0.75

## 2023-09-12 ASSESSMENT — KERATOMETRY
OS_K1POWER_DIOPTERS: 41.50
OD_K1POWER_DIOPTERS: 41.25
OD_AXISANGLE_DEGREES: 170
OS_AXISANGLE_DEGREES: 156
OS_K2POWER_DIOPTERS: 42.25
OD_K2POWER_DIOPTERS: 42.00

## 2023-09-12 ASSESSMENT — REFRACTION_CURRENTRX
OD_OVR_VA: 20/
OD_AXIS: 100
OS_ADD: +2.50
OS_OVR_VA: 20/
OS_VPRISM_DIRECTION: PROGS
OD_CYLINDER: -1.00
OD_AXIS: 103
OD_OVR_VA: 20/
OD_CYLINDER: -0.50
OS_AXIS: 084
OS_AXIS: 090
OS_CYLINDER: -1.00
OS_ADD: +2.50
OD_VPRISM_DIRECTION: PROGS
OS_SPHERE: -0.25
OD_SPHERE: +1.50
OS_CYLINDER: -1.00
OD_ADD: +2.50
OD_VPRISM_DIRECTION: PROGS
OS_SPHERE: +0.25
OS_VPRISM_DIRECTION: PROGS
OS_OVR_VA: 20/
OD_ADD: +2.50
OD_SPHERE: +1.50

## 2023-09-12 ASSESSMENT — REFRACTION_AUTOREFRACTION
OS_CYLINDER: -1.25
OD_SPHERE: +1.75
OS_AXIS: 085
OS_SPHERE: +0.25
OD_CYLINDER: -1.75
OD_AXIS: 087

## 2023-09-12 ASSESSMENT — REFRACTION_MANIFEST
OS_ADD: +2.50
OS_SPHERE: -0.25
OD_CYLINDER: -0.50
OD_CYLINDER: -1.00
OD_VA1: 20/25
OD_AXIS: 100
OS_VA1: 20/30+
OD_SPHERE: +1.50
OS_AXIS: 090
OD_ADD: +2.50
OD_SPHERE: +1.50
OS_CYLINDER: -1.00
OD_AXIS: 100
OD_VA1: 20/25+

## 2023-09-12 ASSESSMENT — VISUAL ACUITY
OS_BCVA: 20/20-1
OD_BCVA: 20/20-1

## 2023-09-13 ENCOUNTER — OFFICE (OUTPATIENT)
Dept: URBAN - METROPOLITAN AREA CLINIC 28 | Facility: CLINIC | Age: 68
Setting detail: OPHTHALMOLOGY
End: 2023-09-13
Payer: MEDICARE

## 2023-09-13 DIAGNOSIS — H40.1111: ICD-10-CM

## 2023-09-13 DIAGNOSIS — H40.1122: ICD-10-CM

## 2023-09-13 PROCEDURE — 99024 POSTOP FOLLOW-UP VISIT: CPT | Performed by: OPHTHALMOLOGY

## 2023-09-13 ASSESSMENT — KERATOMETRY
OD_K1POWER_DIOPTERS: 41.25
OS_AXISANGLE_DEGREES: 156
OD_AXISANGLE_DEGREES: 170
OS_K1POWER_DIOPTERS: 41.50
OD_K2POWER_DIOPTERS: 42.00
OS_K2POWER_DIOPTERS: 42.25

## 2023-09-13 ASSESSMENT — REFRACTION_CURRENTRX
OS_VPRISM_DIRECTION: PROGS
OS_CYLINDER: -1.00
OS_VPRISM_DIRECTION: PROGS
OS_SPHERE: -0.25
OS_AXIS: 090
OD_OVR_VA: 20/
OS_OVR_VA: 20/
OD_AXIS: 100
OD_VPRISM_DIRECTION: PROGS
OD_ADD: +2.50
OS_ADD: +2.50
OS_ADD: +2.50
OD_AXIS: 103
OD_CYLINDER: -1.00
OD_SPHERE: +1.50
OS_CYLINDER: -1.00
OD_SPHERE: +1.50
OS_AXIS: 084
OS_SPHERE: +0.25
OD_CYLINDER: -0.50
OD_ADD: +2.50
OD_VPRISM_DIRECTION: PROGS
OD_OVR_VA: 20/
OS_OVR_VA: 20/

## 2023-09-13 ASSESSMENT — REFRACTION_MANIFEST
OS_AXIS: 090
OD_ADD: +2.50
OD_VA1: 20/25
OD_CYLINDER: -0.50
OS_ADD: +2.50
OD_CYLINDER: -1.00
OS_SPHERE: -0.25
OD_AXIS: 100
OS_CYLINDER: -1.00
OD_AXIS: 100
OD_VA1: 20/25+
OD_SPHERE: +1.50
OS_VA1: 20/30+
OD_SPHERE: +1.50

## 2023-09-13 ASSESSMENT — AXIALLENGTH_DERIVED
OS_AL: 24.359
OD_AL: 23.9449
OS_AL: 24.5158
OD_AL: 23.8948
OD_AL: 23.7953

## 2023-09-13 ASSESSMENT — CORNEAL PTERYGIUM: OS_PTERYGIUM: NASAL

## 2023-09-13 ASSESSMENT — CORNEAL SURGICAL SCARRING: OD_SCARRING: ANTERIOR STROMAL

## 2023-09-13 ASSESSMENT — PACHYMETRY
OS_CT_CORRECTION: 1
OD_CT_CORRECTION: 1
OS_CT_UM: 528
OD_CT_UM: 523

## 2023-09-13 ASSESSMENT — REFRACTION_AUTOREFRACTION
OS_SPHERE: +0.25
OS_CYLINDER: -1.25
OD_SPHERE: +1.75
OD_CYLINDER: -1.75
OS_AXIS: 085
OD_AXIS: 087

## 2023-09-13 ASSESSMENT — SPHEQUIV_DERIVED
OD_SPHEQUIV: 0.875
OD_SPHEQUIV: 1
OS_SPHEQUIV: -0.375
OD_SPHEQUIV: 1.25
OS_SPHEQUIV: -0.75

## 2023-09-13 ASSESSMENT — CORNEAL EDEMA - MICROCYSTIC EPITHELIAL EDEMA (MCE): OD_MCE: T

## 2023-09-13 ASSESSMENT — TEAR BREAK UP TIME (TBUT)
OS_TBUT: T
OD_TBUT: T

## 2023-09-13 ASSESSMENT — SUPERFICIAL PUNCTATE KERATITIS (SPK)
OD_SPK: ABSENT
OS_SPK: T

## 2023-09-13 ASSESSMENT — VISUAL ACUITY
OS_BCVA: 20/20-1
OD_BCVA: 20/20-1

## 2023-09-13 ASSESSMENT — LID EXAM ASSESSMENTS
OD_MEIBOMITIS: T
OS_MEIBOMITIS: T

## 2023-09-13 ASSESSMENT — TONOMETRY: OD_IOP_MMHG: 14

## 2023-09-18 ENCOUNTER — APPOINTMENT (OUTPATIENT)
Dept: INTERNAL MEDICINE | Facility: CLINIC | Age: 68
End: 2023-09-18

## 2023-09-18 ENCOUNTER — APPOINTMENT (OUTPATIENT)
Dept: INTERNAL MEDICINE | Facility: CLINIC | Age: 68
End: 2023-09-18
Payer: MEDICARE

## 2023-09-18 VITALS
HEIGHT: 66 IN | SYSTOLIC BLOOD PRESSURE: 144 MMHG | BODY MASS INDEX: 34.39 KG/M2 | OXYGEN SATURATION: 98 % | TEMPERATURE: 98 F | DIASTOLIC BLOOD PRESSURE: 86 MMHG | HEART RATE: 95 BPM | WEIGHT: 214 LBS | RESPIRATION RATE: 16 BRPM

## 2023-09-18 PROCEDURE — 90662 IIV NO PRSV INCREASED AG IM: CPT

## 2023-09-18 PROCEDURE — 99213 OFFICE O/P EST LOW 20 MIN: CPT | Mod: 25

## 2023-09-18 PROCEDURE — G0008: CPT

## 2023-09-18 RX ORDER — DICLOFENAC SODIUM 75 MG/1
75 TABLET, DELAYED RELEASE ORAL
Qty: 30 | Refills: 0 | Status: DISCONTINUED | COMMUNITY
Start: 2023-03-29

## 2023-09-18 RX ORDER — LOTEPREDNOL ETABONATE 2 MG/ML
0.2 SUSPENSION/ DROPS OPHTHALMIC
Qty: 5 | Refills: 0 | Status: DISCONTINUED | COMMUNITY
Start: 2023-08-23

## 2023-09-18 RX ORDER — NORTRIPTYLINE HYDROCHLORIDE 25 MG/1
25 CAPSULE ORAL
Qty: 30 | Refills: 0 | Status: DISCONTINUED | COMMUNITY
Start: 2023-06-03

## 2023-09-18 RX ORDER — RUXOLITINIB 15 MG/G
1.5 CREAM TOPICAL
Qty: 60 | Refills: 0 | Status: DISCONTINUED | COMMUNITY
Start: 2023-07-13

## 2023-09-18 RX ORDER — LIFITEGRAST 50 MG/ML
5 SOLUTION/ DROPS OPHTHALMIC
Qty: 60 | Refills: 0 | Status: DISCONTINUED | COMMUNITY
Start: 2023-04-10

## 2023-09-19 LAB
ALBUMIN SERPL ELPH-MCNC: 4.9 G/DL
ALP BLD-CCNC: 64 U/L
ALT SERPL-CCNC: 11 U/L
ANION GAP SERPL CALC-SCNC: 10 MMOL/L
AST SERPL-CCNC: 25 U/L
BASOPHILS # BLD AUTO: 0.04 K/UL
BASOPHILS NFR BLD AUTO: 0.7 %
BILIRUB SERPL-MCNC: <0.2 MG/DL
BUN SERPL-MCNC: 19 MG/DL
CALCIUM SERPL-MCNC: 10.3 MG/DL
CHLORIDE SERPL-SCNC: 103 MMOL/L
CHOLEST SERPL-MCNC: 239 MG/DL
CO2 SERPL-SCNC: 28 MMOL/L
CREAT SERPL-MCNC: 0.99 MG/DL
CREAT SPEC-SCNC: 118 MG/DL
EGFR: 63 ML/MIN/1.73M2
EOSINOPHIL # BLD AUTO: 0.15 K/UL
EOSINOPHIL NFR BLD AUTO: 2.6 %
ESTIMATED AVERAGE GLUCOSE: 146 MG/DL
GGT SERPL-CCNC: 12 U/L
GLUCOSE SERPL-MCNC: 96 MG/DL
HBA1C MFR BLD HPLC: 6.7 %
HCT VFR BLD CALC: 33.1 %
HDLC SERPL-MCNC: 110 MG/DL
HGB BLD-MCNC: 10.5 G/DL
IMM GRANULOCYTES NFR BLD AUTO: 0.2 %
LDLC SERPL CALC-MCNC: 114 MG/DL
LYMPHOCYTES # BLD AUTO: 2.03 K/UL
LYMPHOCYTES NFR BLD AUTO: 35.2 %
MAN DIFF?: NORMAL
MCHC RBC-ENTMCNC: 27.1 PG
MCHC RBC-ENTMCNC: 31.7 GM/DL
MCV RBC AUTO: 85.5 FL
MICROALBUMIN 24H UR DL<=1MG/L-MCNC: 24.3 MG/DL
MICROALBUMIN/CREAT 24H UR-RTO: 206 MG/G
MONOCYTES # BLD AUTO: 0.56 K/UL
MONOCYTES NFR BLD AUTO: 9.7 %
NEUTROPHILS # BLD AUTO: 2.97 K/UL
NEUTROPHILS NFR BLD AUTO: 51.6 %
NONHDLC SERPL-MCNC: 129 MG/DL
PLATELET # BLD AUTO: 338 K/UL
POTASSIUM SERPL-SCNC: 4.4 MMOL/L
PROT SERPL-MCNC: 7.5 G/DL
RBC # BLD: 3.87 M/UL
RBC # FLD: 17.1 %
SODIUM SERPL-SCNC: 142 MMOL/L
TRIGL SERPL-MCNC: 92 MG/DL
TSH SERPL-ACNC: 0.9 UIU/ML
WBC # FLD AUTO: 5.76 K/UL

## 2023-09-19 RX ORDER — METFORMIN HYDROCHLORIDE 500 MG/1
500 TABLET, COATED ORAL TWICE DAILY
Qty: 180 | Refills: 3 | Status: ACTIVE | COMMUNITY
Start: 2023-09-19 | End: 1900-01-01

## 2023-09-19 RX ORDER — ATORVASTATIN CALCIUM 10 MG/1
10 TABLET, FILM COATED ORAL DAILY
Qty: 90 | Refills: 3 | Status: ACTIVE | COMMUNITY
Start: 2023-09-19 | End: 1900-01-01

## 2023-09-20 ENCOUNTER — Encounter (OUTPATIENT)
Dept: URBAN - METROPOLITAN AREA CLINIC 28 | Facility: CLINIC | Age: 68
Setting detail: OPHTHALMOLOGY
End: 2023-09-20
Payer: MEDICARE

## 2023-09-20 LAB
APPEARANCE: CLEAR
BACTERIA: NEGATIVE /HPF
BILIRUBIN URINE: NEGATIVE
BLOOD URINE: NEGATIVE
CAST: 3 /LPF
COLOR: YELLOW
EPITHELIAL CELLS: 9 /HPF
GLUCOSE QUALITATIVE U: NEGATIVE MG/DL
KETONES URINE: NEGATIVE MG/DL
LEUKOCYTE ESTERASE URINE: NEGATIVE
MICROSCOPIC-UA: NORMAL
NITRITE URINE: NEGATIVE
PH URINE: 6
PROTEIN URINE: 30 MG/DL
RED BLOOD CELLS URINE: 1 /HPF
SPECIFIC GRAVITY URINE: 1.02
UROBILINOGEN URINE: 0.2 MG/DL
WHITE BLOOD CELLS URINE: 1 /HPF

## 2023-09-21 ENCOUNTER — RX ONLY (RX ONLY)
Age: 68
End: 2023-09-21

## 2023-09-21 ENCOUNTER — OFFICE (OUTPATIENT)
Dept: URBAN - METROPOLITAN AREA CLINIC 28 | Facility: CLINIC | Age: 68
Setting detail: OPHTHALMOLOGY
End: 2023-09-21
Payer: MEDICARE

## 2023-09-21 DIAGNOSIS — H40.1122: ICD-10-CM

## 2023-09-21 DIAGNOSIS — H40.1111: ICD-10-CM

## 2023-09-21 PROCEDURE — 92012 INTRM OPH EXAM EST PATIENT: CPT | Performed by: OPHTHALMOLOGY

## 2023-09-21 ASSESSMENT — AXIALLENGTH_DERIVED
OS_AL: 24.359
OS_AL: 24.5158
OD_AL: 23.9449
OD_AL: 23.8948
OD_AL: 23.7953

## 2023-09-21 ASSESSMENT — REFRACTION_AUTOREFRACTION
OS_AXIS: 085
OD_CYLINDER: -1.75
OS_CYLINDER: -1.25
OD_AXIS: 087
OD_SPHERE: +1.75
OS_SPHERE: +0.25

## 2023-09-21 ASSESSMENT — REFRACTION_CURRENTRX
OD_SPHERE: +1.50
OS_VPRISM_DIRECTION: PROGS
OD_OVR_VA: 20/
OS_SPHERE: +0.25
OD_OVR_VA: 20/
OS_VPRISM_DIRECTION: PROGS
OD_SPHERE: +1.50
OD_AXIS: 103
OS_CYLINDER: -1.00
OS_OVR_VA: 20/
OD_VPRISM_DIRECTION: PROGS
OS_OVR_VA: 20/
OD_CYLINDER: -0.50
OD_CYLINDER: -1.00
OS_ADD: +2.50
OS_CYLINDER: -1.00
OS_ADD: +2.50
OD_ADD: +2.50
OD_VPRISM_DIRECTION: PROGS
OS_AXIS: 084
OD_ADD: +2.50
OS_AXIS: 090
OD_AXIS: 100
OS_SPHERE: -0.25

## 2023-09-21 ASSESSMENT — REFRACTION_MANIFEST
OD_AXIS: 100
OD_VA1: 20/25+
OD_CYLINDER: -0.50
OD_SPHERE: +1.50
OS_ADD: +2.50
OD_ADD: +2.50
OS_CYLINDER: -1.00
OS_AXIS: 090
OS_SPHERE: -0.25
OD_AXIS: 100
OD_VA1: 20/25
OD_SPHERE: +1.50
OS_VA1: 20/30+
OD_CYLINDER: -1.00

## 2023-09-21 ASSESSMENT — CORNEAL EDEMA - MICROCYSTIC EPITHELIAL EDEMA (MCE): OD_MCE: T

## 2023-09-21 ASSESSMENT — KERATOMETRY
OS_K2POWER_DIOPTERS: 42.25
OS_K1POWER_DIOPTERS: 41.50
OD_K2POWER_DIOPTERS: 42.00
OD_AXISANGLE_DEGREES: 170
OS_AXISANGLE_DEGREES: 156
OD_K1POWER_DIOPTERS: 41.25

## 2023-09-21 ASSESSMENT — PACHYMETRY
OD_CT_UM: 523
OS_CT_UM: 528
OD_CT_CORRECTION: 1
OS_CT_CORRECTION: 1

## 2023-09-21 ASSESSMENT — VISUAL ACUITY
OD_BCVA: 20/20-1
OS_BCVA: 20/20-1

## 2023-09-21 ASSESSMENT — SPHEQUIV_DERIVED
OD_SPHEQUIV: 1.25
OD_SPHEQUIV: 0.875
OS_SPHEQUIV: -0.375
OD_SPHEQUIV: 1
OS_SPHEQUIV: -0.75

## 2023-09-21 ASSESSMENT — TEAR BREAK UP TIME (TBUT)
OD_TBUT: T
OS_TBUT: T

## 2023-09-21 ASSESSMENT — TONOMETRY
OS_IOP_MMHG: 15
OD_IOP_MMHG: 13

## 2023-09-21 ASSESSMENT — LID EXAM ASSESSMENTS
OS_MEIBOMITIS: T
OD_MEIBOMITIS: T

## 2023-09-21 ASSESSMENT — CONFRONTATIONAL VISUAL FIELD TEST (CVF)
OS_FINDINGS: FULL
OD_FINDINGS: FULL

## 2023-09-21 ASSESSMENT — SUPERFICIAL PUNCTATE KERATITIS (SPK)
OS_SPK: T
OD_SPK: ABSENT

## 2023-09-21 ASSESSMENT — CORNEAL SURGICAL SCARRING: OD_SCARRING: ANTERIOR STROMAL

## 2023-09-21 ASSESSMENT — CORNEAL PTERYGIUM: OS_PTERYGIUM: NASAL

## 2023-10-05 ENCOUNTER — OFFICE (OUTPATIENT)
Dept: URBAN - METROPOLITAN AREA CLINIC 28 | Facility: CLINIC | Age: 68
Setting detail: OPHTHALMOLOGY
End: 2023-10-05
Payer: MEDICARE

## 2023-10-05 DIAGNOSIS — H40.1111: ICD-10-CM

## 2023-10-05 DIAGNOSIS — H40.1122: ICD-10-CM

## 2023-10-05 DIAGNOSIS — H11.042: ICD-10-CM

## 2023-10-05 DIAGNOSIS — H16.223: ICD-10-CM

## 2023-10-05 DIAGNOSIS — H25.13: ICD-10-CM

## 2023-10-05 PROBLEM — H04.123 DRY EYE SYNDROME LACRIMAL GLAND; BOTH EYES: Status: ACTIVE | Noted: 2023-10-05

## 2023-10-05 PROCEDURE — 99024 POSTOP FOLLOW-UP VISIT: CPT | Performed by: OPHTHALMOLOGY

## 2023-10-05 ASSESSMENT — CONFRONTATIONAL VISUAL FIELD TEST (CVF)
OS_FINDINGS: FULL
OD_FINDINGS: FULL

## 2023-10-05 ASSESSMENT — REFRACTION_CURRENTRX
OD_SPHERE: +1.50
OD_CYLINDER: -1.00
OS_OVR_VA: 20/
OS_ADD: +2.50
OD_OVR_VA: 20/
OS_CYLINDER: -1.00
OD_AXIS: 100
OS_VPRISM_DIRECTION: PROGS
OS_AXIS: 090
OD_VPRISM_DIRECTION: PROGS
OS_SPHERE: -0.25
OD_AXIS: 103
OS_CYLINDER: -1.00
OS_ADD: +2.50
OD_SPHERE: +1.50
OD_VPRISM_DIRECTION: PROGS
OD_ADD: +2.50
OS_AXIS: 084
OS_VPRISM_DIRECTION: PROGS
OD_OVR_VA: 20/
OS_OVR_VA: 20/
OS_SPHERE: +0.25
OD_ADD: +2.50
OD_CYLINDER: -0.50

## 2023-10-05 ASSESSMENT — CORNEAL SURGICAL SCARRING: OD_SCARRING: ANTERIOR STROMAL

## 2023-10-05 ASSESSMENT — REFRACTION_MANIFEST
OD_ADD: +2.50
OS_CYLINDER: -1.00
OD_CYLINDER: -1.00
OD_SPHERE: +1.50
OD_VA1: 20/25+
OD_AXIS: 100
OS_AXIS: 090
OD_SPHERE: +1.50
OD_AXIS: 100
OS_VA1: 20/30+
OS_ADD: +2.50
OD_VA1: 20/25
OS_SPHERE: -0.25
OD_CYLINDER: -0.50

## 2023-10-05 ASSESSMENT — LID EXAM ASSESSMENTS
OS_MEIBOMITIS: T
OD_MEIBOMITIS: T

## 2023-10-05 ASSESSMENT — CORNEAL PTERYGIUM: OS_PTERYGIUM: NASAL

## 2023-10-05 ASSESSMENT — VISUAL ACUITY
OS_BCVA: 20/20-1
OD_BCVA: 20/20-1

## 2023-10-05 ASSESSMENT — KERATOMETRY
OS_K2POWER_DIOPTERS: 42.25
OS_AXISANGLE_DEGREES: 156
OD_AXISANGLE_DEGREES: 003
OD_K2POWER_DIOPTERS: 41.75
OD_K1POWER_DIOPTERS: 41.25
OS_K1POWER_DIOPTERS: 41.50

## 2023-10-05 ASSESSMENT — AXIALLENGTH_DERIVED
OS_AL: 24.4111
OS_AL: 24.5158
OD_AL: 23.8421
OD_AL: 23.9923
OD_AL: 23.942

## 2023-10-05 ASSESSMENT — SUPERFICIAL PUNCTATE KERATITIS (SPK)
OD_SPK: T
OS_SPK: T

## 2023-10-05 ASSESSMENT — REFRACTION_AUTOREFRACTION
OS_SPHERE: +0.25
OD_SPHERE: +1.50
OD_CYLINDER: -1.25
OS_AXIS: 081
OD_AXIS: 091
OS_CYLINDER: -1.50

## 2023-10-05 ASSESSMENT — SPHEQUIV_DERIVED
OD_SPHEQUIV: 1
OD_SPHEQUIV: 1.25
OS_SPHEQUIV: -0.75
OS_SPHEQUIV: -0.5
OD_SPHEQUIV: 0.875

## 2023-10-05 ASSESSMENT — TONOMETRY
OD_IOP_MMHG: 11
OS_IOP_MMHG: 12

## 2023-10-05 ASSESSMENT — PACHYMETRY
OD_CT_UM: 523
OS_CT_CORRECTION: 1
OD_CT_CORRECTION: 1
OS_CT_UM: 528

## 2023-10-05 ASSESSMENT — CORNEAL EDEMA - MICROCYSTIC EPITHELIAL EDEMA (MCE): OD_MCE: ABSENT

## 2023-10-05 ASSESSMENT — TEAR BREAK UP TIME (TBUT)
OS_TBUT: T
OD_TBUT: T

## 2023-12-12 ENCOUNTER — APPOINTMENT (OUTPATIENT)
Dept: INTERNAL MEDICINE | Facility: CLINIC | Age: 68
End: 2023-12-12
Payer: MEDICARE

## 2023-12-12 ENCOUNTER — NON-APPOINTMENT (OUTPATIENT)
Age: 68
End: 2023-12-12

## 2023-12-12 VITALS
TEMPERATURE: 97.5 F | RESPIRATION RATE: 16 BRPM | HEIGHT: 66 IN | WEIGHT: 215 LBS | BODY MASS INDEX: 34.55 KG/M2 | OXYGEN SATURATION: 100 % | HEART RATE: 76 BPM | DIASTOLIC BLOOD PRESSURE: 86 MMHG | SYSTOLIC BLOOD PRESSURE: 149 MMHG

## 2023-12-12 PROCEDURE — 99213 OFFICE O/P EST LOW 20 MIN: CPT | Mod: 25

## 2023-12-12 PROCEDURE — 93000 ELECTROCARDIOGRAM COMPLETE: CPT | Mod: 59

## 2023-12-12 RX ORDER — LOSARTAN POTASSIUM AND HYDROCHLOROTHIAZIDE 25; 100 MG/1; MG/1
100-25 TABLET ORAL
Qty: 90 | Refills: 3 | Status: ACTIVE | COMMUNITY
Start: 2023-12-12 | End: 1900-01-01

## 2023-12-12 RX ORDER — CANDESARTAN CILEXETIL 16 MG/1
16 TABLET ORAL
Qty: 90 | Refills: 3 | Status: DISCONTINUED | COMMUNITY
Start: 2023-09-19 | End: 2023-12-12

## 2023-12-13 LAB
ALBUMIN SERPL ELPH-MCNC: 4.3 G/DL
ALP BLD-CCNC: 65 U/L
ALT SERPL-CCNC: 12 U/L
ANION GAP SERPL CALC-SCNC: 10 MMOL/L
AST SERPL-CCNC: 26 U/L
BASOPHILS # BLD AUTO: 0.04 K/UL
BASOPHILS NFR BLD AUTO: 0.6 %
BILIRUB SERPL-MCNC: <0.2 MG/DL
BUN SERPL-MCNC: 24 MG/DL
CALCIUM SERPL-MCNC: 8.9 MG/DL
CHLORIDE SERPL-SCNC: 106 MMOL/L
CHOLEST SERPL-MCNC: 181 MG/DL
CO2 SERPL-SCNC: 25 MMOL/L
CREAT SERPL-MCNC: 1.12 MG/DL
EGFR: 54 ML/MIN/1.73M2
EOSINOPHIL # BLD AUTO: 0.16 K/UL
EOSINOPHIL NFR BLD AUTO: 2.3 %
ESTIMATED AVERAGE GLUCOSE: 140 MG/DL
GGT SERPL-CCNC: 11 U/L
GLUCOSE SERPL-MCNC: 93 MG/DL
HBA1C MFR BLD HPLC: 6.5 %
HCT VFR BLD CALC: 28.7 %
HDLC SERPL-MCNC: 97 MG/DL
HGB BLD-MCNC: 9 G/DL
IMM GRANULOCYTES NFR BLD AUTO: 0.3 %
LDLC SERPL CALC-MCNC: 75 MG/DL
LYMPHOCYTES # BLD AUTO: 2.09 K/UL
LYMPHOCYTES NFR BLD AUTO: 29.4 %
MAN DIFF?: NORMAL
MCHC RBC-ENTMCNC: 26.2 PG
MCHC RBC-ENTMCNC: 31.4 GM/DL
MCV RBC AUTO: 83.4 FL
MONOCYTES # BLD AUTO: 0.78 K/UL
MONOCYTES NFR BLD AUTO: 11 %
NEUTROPHILS # BLD AUTO: 4.02 K/UL
NEUTROPHILS NFR BLD AUTO: 56.4 %
NONHDLC SERPL-MCNC: 84 MG/DL
PLATELET # BLD AUTO: 328 K/UL
POTASSIUM SERPL-SCNC: 4.6 MMOL/L
PROT SERPL-MCNC: 7.2 G/DL
RBC # BLD: 3.44 M/UL
RBC # FLD: 16.8 %
SODIUM SERPL-SCNC: 142 MMOL/L
TRIGL SERPL-MCNC: 42 MG/DL
WBC # FLD AUTO: 7.11 K/UL

## 2024-01-10 ENCOUNTER — OFFICE (OUTPATIENT)
Dept: URBAN - METROPOLITAN AREA CLINIC 28 | Facility: CLINIC | Age: 69
Setting detail: OPHTHALMOLOGY
End: 2024-01-10
Payer: MEDICARE

## 2024-01-10 DIAGNOSIS — H40.1122: ICD-10-CM

## 2024-01-10 DIAGNOSIS — H04.123: ICD-10-CM

## 2024-01-10 DIAGNOSIS — H25.13: ICD-10-CM

## 2024-01-10 DIAGNOSIS — H40.1111: ICD-10-CM

## 2024-01-10 PROCEDURE — 92133 CPTRZD OPH DX IMG PST SGM ON: CPT | Performed by: OPHTHALMOLOGY

## 2024-01-10 PROCEDURE — 92012 INTRM OPH EXAM EST PATIENT: CPT | Performed by: OPHTHALMOLOGY

## 2024-01-10 ASSESSMENT — REFRACTION_MANIFEST
OD_CYLINDER: -1.00
OS_ADD: +2.50
OD_SPHERE: +1.50
OS_VA1: 20/30+
OS_SPHERE: -0.25
OD_AXIS: 100
OD_VA1: 20/25
OS_AXIS: 090
OD_VA1: 20/25+
OS_CYLINDER: -1.00
OD_SPHERE: +1.50
OD_CYLINDER: -0.50
OD_ADD: +2.50
OD_AXIS: 100

## 2024-01-10 ASSESSMENT — REFRACTION_AUTOREFRACTION
OS_AXIS: 090
OD_SPHERE: +1.00
OD_CYLINDER: -1.00
OS_SPHERE: -0.25
OD_AXIS: 100
OS_CYLINDER: -1.25

## 2024-01-10 ASSESSMENT — REFRACTION_CURRENTRX
OD_OVR_VA: 20/
OD_ADD: +2.50
OD_VPRISM_DIRECTION: PROGS
OD_CYLINDER: -0.50
OD_AXIS: 103
OS_CYLINDER: -1.00
OS_OVR_VA: 20/
OD_SPHERE: +1.50
OS_VPRISM_DIRECTION: PROGS
OS_SPHERE: +0.25
OS_ADD: +2.50
OS_SPHERE: -0.25
OS_OVR_VA: 20/
OS_AXIS: 084
OD_SPHERE: +1.50
OD_AXIS: 100
OS_CYLINDER: -1.00
OS_AXIS: 090
OD_OVR_VA: 20/
OS_ADD: +2.50
OD_ADD: +2.50
OD_CYLINDER: -1.00
OD_VPRISM_DIRECTION: PROGS
OS_VPRISM_DIRECTION: PROGS

## 2024-01-10 ASSESSMENT — TEAR BREAK UP TIME (TBUT)
OS_TBUT: T
OD_TBUT: T

## 2024-01-10 ASSESSMENT — SUPERFICIAL PUNCTATE KERATITIS (SPK)
OS_SPK: T
OD_SPK: T

## 2024-01-10 ASSESSMENT — CORNEAL PTERYGIUM: OS_PTERYGIUM: NASAL

## 2024-01-10 ASSESSMENT — SPHEQUIV_DERIVED
OD_SPHEQUIV: 1.25
OS_SPHEQUIV: -0.875
OD_SPHEQUIV: 0.5
OS_SPHEQUIV: -0.75
OD_SPHEQUIV: 1

## 2024-01-10 ASSESSMENT — LID EXAM ASSESSMENTS
OS_MEIBOMITIS: T
OD_MEIBOMITIS: T

## 2024-01-10 ASSESSMENT — CONFRONTATIONAL VISUAL FIELD TEST (CVF)
OD_FINDINGS: FULL
OS_FINDINGS: FULL

## 2024-01-10 ASSESSMENT — CORNEAL SURGICAL SCARRING: OD_SCARRING: ANTERIOR STROMAL

## 2024-02-01 ENCOUNTER — APPOINTMENT (OUTPATIENT)
Dept: INTERNAL MEDICINE | Facility: CLINIC | Age: 69
End: 2024-02-01
Payer: MEDICARE

## 2024-02-01 VITALS
RESPIRATION RATE: 16 BRPM | WEIGHT: 211 LBS | DIASTOLIC BLOOD PRESSURE: 80 MMHG | BODY MASS INDEX: 33.91 KG/M2 | HEART RATE: 77 BPM | SYSTOLIC BLOOD PRESSURE: 131 MMHG | OXYGEN SATURATION: 97 % | TEMPERATURE: 97.7 F | HEIGHT: 66 IN

## 2024-02-01 PROCEDURE — 99213 OFFICE O/P EST LOW 20 MIN: CPT

## 2024-02-01 NOTE — REVIEW OF SYSTEMS
[FreeTextEntry9] : As per HPI. [TextEntry] : CARDIOVASCULAR: Negative RESPIRATORY: Negative GASTROINTESTINAL: Negative NEUROLOGICAL: Negative

## 2024-02-01 NOTE — ASSESSMENT
[FreeTextEntry1] : 68 year old female found to have stable Essential Hypertension, Hypercholesterolemia, Type 2 Diabetes Mellitus, Microalbuminuria, Anemia, Eczema, with the current prescription regimen as recommended, diet and lifestyle modifications, as counseled. Prior results reviewed, interpreted and discussed with the patient during today's examination, as appropriate. Follow up, treatment plan and tests, as ordered.   Current symptoms are likely consistent with Myalgia, less likely to be neurological or vascular etiology, Meloxicam as Rxed, if symptoms persist consider NEURO F/U, local care with warm compresses and massage for relief, as counseled.  Total time spent:: 25 minutes Including: Preparation prior to visit - Reviewing prior record, results of tests and Consultation Reports as applicable Conducting an appropriate H & P during today's encounter Appropriate orders for tests, medications and procedures, as applicable Counseling patient Note completion

## 2024-02-01 NOTE — PHYSICAL EXAM
89 [de-identified] : No swelling or tenderness over posterior right thigh, no skin changes, no increased warmth noted at this time. [TextEntry] : PULMONARY:  No respiratory distress and lungs were clear to auscultation bilaterally. HEART:  Heart rate was normal and rhythm regular, normal S1 and S2, no gallops/murmur/pericardial rub. VASCULAR:  No peripheral edema. ABDOMEN:  Normal bowel sounds, soft, non-tender, no hepatosplenomegaly and no abdominal mass palpated. NEUROLOGICAL: Normal gait and reflexes, no focal deficits. No

## 2024-02-01 NOTE — HEALTH RISK ASSESSMENT
[No] : In the past 12 months have you used drugs other than those required for medical reasons? No [No falls in past year] : Patient reported no falls in the past year [0] : 2) Feeling down, depressed, or hopeless: Not at all (0) [Never] : Never [de-identified] : None [[id number]] : 0

## 2024-02-01 NOTE — HISTORY OF PRESENT ILLNESS
[de-identified] : 68 year old  female patient with history of stable Essential Hypertension, Hypercholesterolemia, Type 2 Diabetes Mellitus, Microalbuminuria, Anemia, Eczema, history as stated, presented for follow up examination with complaint of pain in the back of right thigh, for approximately 2 weeks, non-radiating, pain increases upon movement, no history of recent injury as per patient, no history of similar symptoms in the past, patient has not taken any medication yet to relieve the pain, no swelling of the right lower extremity. Patient is compliant with all medications. ROS as stated.

## 2024-03-09 ENCOUNTER — APPOINTMENT (OUTPATIENT)
Dept: INTERNAL MEDICINE | Facility: CLINIC | Age: 69
End: 2024-03-09
Payer: MEDICARE

## 2024-03-09 VITALS
OXYGEN SATURATION: 99 % | DIASTOLIC BLOOD PRESSURE: 76 MMHG | SYSTOLIC BLOOD PRESSURE: 118 MMHG | HEIGHT: 66 IN | WEIGHT: 210 LBS | HEART RATE: 74 BPM | BODY MASS INDEX: 33.75 KG/M2 | TEMPERATURE: 98 F | RESPIRATION RATE: 16 BRPM

## 2024-03-09 PROCEDURE — 99213 OFFICE O/P EST LOW 20 MIN: CPT | Mod: 25

## 2024-03-09 NOTE — HEALTH RISK ASSESSMENT
[No] : In the past 12 months have you used drugs other than those required for medical reasons? No [No falls in past year] : Patient reported no falls in the past year [0] : 2) Feeling down, depressed, or hopeless: Not at all (0) [Never] : Never [de-identified] : None [[id number]] : 0

## 2024-03-09 NOTE — ASSESSMENT
[FreeTextEntry1] : 68 year old female found to have stable Essential Hypertension, Hypercholesterolemia, Type 2 Diabetes Mellitus, Microalbuminuria, Anemia, Eczema, with the current prescription regimen as recommended, diet and lifestyle modifications, as counseled. Prior results reviewed, interpreted and discussed with the patient during today's examination, as appropriate. Follow up, treatment plan and tests, as ordered.   Total time spent:: 25 minutes Including: Preparation prior to visit - Reviewing prior record, results of tests and Consultation Reports as applicable Conducting an appropriate H & P during today's encounter Appropriate orders for tests, medications and procedures, as applicable Counseling patient Note completion

## 2024-03-09 NOTE — HISTORY OF PRESENT ILLNESS
[de-identified] : 68 year old  female patient with history of stable Essential Hypertension, Hypercholesterolemia, Type 2 Diabetes Mellitus, Microalbuminuria, Anemia, Eczema, history as stated, presented for follow up examination. Patient is compliant with all medications. ROS as stated.

## 2024-03-23 ENCOUNTER — TRANSCRIPTION ENCOUNTER (OUTPATIENT)
Age: 69
End: 2024-03-23

## 2024-04-15 ENCOUNTER — APPOINTMENT (OUTPATIENT)
Dept: INTERNAL MEDICINE | Facility: CLINIC | Age: 69
End: 2024-04-15
Payer: MEDICARE

## 2024-04-15 VITALS
SYSTOLIC BLOOD PRESSURE: 154 MMHG | HEART RATE: 80 BPM | DIASTOLIC BLOOD PRESSURE: 90 MMHG | TEMPERATURE: 98 F | OXYGEN SATURATION: 97 % | BODY MASS INDEX: 34.39 KG/M2 | RESPIRATION RATE: 16 BRPM | HEIGHT: 66 IN | WEIGHT: 214 LBS

## 2024-04-15 DIAGNOSIS — L30.9 DERMATITIS, UNSPECIFIED: ICD-10-CM

## 2024-04-15 PROCEDURE — 99213 OFFICE O/P EST LOW 20 MIN: CPT | Mod: 25

## 2024-04-15 NOTE — HISTORY OF PRESENT ILLNESS
[de-identified] : 68 year old  female patient with history of stable Essential Hypertension, Hypercholesterolemia, Type 2 Diabetes Mellitus, Microalbuminuria, Anemia, Eczema, history as stated, presented for follow up examination. Patient is compliant with all medications. ROS as stated.

## 2024-04-15 NOTE — HEALTH RISK ASSESSMENT
[No] : In the past 12 months have you used drugs other than those required for medical reasons? No [No falls in past year] : Patient reported no falls in the past year [0] : 2) Feeling down, depressed, or hopeless: Not at all (0) [Never] : Never [de-identified] : None [[id number]] : 0

## 2024-04-16 ENCOUNTER — NON-APPOINTMENT (OUTPATIENT)
Age: 69
End: 2024-04-16

## 2024-04-17 LAB
AMPHET UR-MCNC: NEGATIVE NG/ML
BARBITURATES UR-MCNC: NEGATIVE NG/ML
BENZODIAZ UR-MCNC: NEGATIVE NG/ML
COCAINE METAB.OTHER UR-MCNC: NEGATIVE NG/ML
CREATININE, URINE: 117.3 MG/DL
FENTANYL, URINE: NEGATIVE NG/ML
METHADONE UR-MCNC: NEGATIVE NG/ML
OPIATES UR-MCNC: NEGATIVE NG/ML
OXYCODONE/OXYMORPHONE, URINE: NEGATIVE NG/ML
PCP UR-MCNC: NEGATIVE NG/ML
PH, URINE: 5.3
PLEASE NOTE: DRUGSCRUR: NORMAL
THC UR QL: NEGATIVE NG/ML

## 2024-04-18 LAB
M TB IFN-G BLD-IMP: NEGATIVE
QUANTIFERON TB PLUS MITOGEN MINUS NIL: >10 IU/ML
QUANTIFERON TB PLUS NIL: 0.04 IU/ML
QUANTIFERON TB PLUS TB1 MINUS NIL: 0.03 IU/ML
QUANTIFERON TB PLUS TB2 MINUS NIL: 0 IU/ML

## 2024-05-02 ENCOUNTER — RX RENEWAL (OUTPATIENT)
Age: 69
End: 2024-05-02

## 2024-05-02 RX ORDER — MELOXICAM 7.5 MG/1
7.5 TABLET ORAL
Qty: 30 | Refills: 5 | Status: ACTIVE | COMMUNITY
Start: 2024-02-01 | End: 1900-01-01

## 2024-06-22 ENCOUNTER — APPOINTMENT (OUTPATIENT)
Dept: INTERNAL MEDICINE | Facility: CLINIC | Age: 69
End: 2024-06-22
Payer: MEDICARE

## 2024-06-22 VITALS
WEIGHT: 212 LBS | OXYGEN SATURATION: 99 % | TEMPERATURE: 98 F | HEIGHT: 66 IN | HEART RATE: 70 BPM | DIASTOLIC BLOOD PRESSURE: 82 MMHG | BODY MASS INDEX: 34.07 KG/M2 | SYSTOLIC BLOOD PRESSURE: 131 MMHG | RESPIRATION RATE: 16 BRPM

## 2024-06-22 DIAGNOSIS — D57.3 SICKLE-CELL TRAIT: ICD-10-CM

## 2024-06-22 DIAGNOSIS — E78.00 PURE HYPERCHOLESTEROLEMIA, UNSPECIFIED: ICD-10-CM

## 2024-06-22 DIAGNOSIS — R80.9 PROTEINURIA, UNSPECIFIED: ICD-10-CM

## 2024-06-22 DIAGNOSIS — E11.9 TYPE 2 DIABETES MELLITUS W/OUT COMPLICATIONS: ICD-10-CM

## 2024-06-22 DIAGNOSIS — I10 ESSENTIAL (PRIMARY) HYPERTENSION: ICD-10-CM

## 2024-06-22 PROCEDURE — 99213 OFFICE O/P EST LOW 20 MIN: CPT | Mod: 25

## 2024-06-22 NOTE — HISTORY OF PRESENT ILLNESS
[de-identified] : 69 year old  female patient with history of stable  Essential Hypertension, Hypercholesterolemia, Type 2 Diabetes Mellitus, Microalbuminuria, Hemoglobin S Trait, Anemia, Eczema, history as stated, presented for follow up examination. Patient is compliant with all medications. ROS as stated.

## 2024-06-22 NOTE — ASSESSMENT
[FreeTextEntry1] : 69 year old female found to have stable Essential Hypertension, Hypercholesterolemia, Type 2 Diabetes Mellitus, Microalbuminuria, Hemoglobin S Trait, Anemia, Eczema, with the current prescription regimen as recommended, diet and lifestyle modifications, as counseled. Prior results reviewed, interpreted and discussed with the patient during today's examination, as appropriate. Follow up, treatment plan and tests, as ordered.   Total time spent:: 25 minutes Including: Preparation prior to visit - Reviewing prior record, results of tests and Consultation Reports as applicable Conducting an appropriate H & P during today's encounter Appropriate orders for tests, medications and procedures, as applicable Counseling patient Note completion

## 2024-06-22 NOTE — HEALTH RISK ASSESSMENT
[No] : In the past 12 months have you used drugs other than those required for medical reasons? No [No falls in past year] : Patient reported no falls in the past year [0] : 2) Feeling down, depressed, or hopeless: Not at all (0) [Never] : Never [de-identified] : None [[id number]] : 0

## 2024-06-24 LAB
ALBUMIN SERPL ELPH-MCNC: 4.4 G/DL
ALP BLD-CCNC: 72 U/L
ALT SERPL-CCNC: 14 U/L
ANION GAP SERPL CALC-SCNC: 15 MMOL/L
AST SERPL-CCNC: 26 U/L
BASOPHILS # BLD AUTO: 0.04 K/UL
BASOPHILS NFR BLD AUTO: 0.6 %
BILIRUB SERPL-MCNC: 0.2 MG/DL
BUN SERPL-MCNC: 23 MG/DL
CALCIUM SERPL-MCNC: 9.5 MG/DL
CHLORIDE SERPL-SCNC: 105 MMOL/L
CHOLEST SERPL-MCNC: 196 MG/DL
CO2 SERPL-SCNC: 23 MMOL/L
CREAT SERPL-MCNC: 1.2 MG/DL
EGFR: 50 ML/MIN/1.73M2
EOSINOPHIL # BLD AUTO: 0.4 K/UL
EOSINOPHIL NFR BLD AUTO: 6.2 %
ESTIMATED AVERAGE GLUCOSE: 134 MG/DL
FERRITIN SERPL-MCNC: 17 NG/ML
FOLATE SERPL-MCNC: 11.9 NG/ML
GGT SERPL-CCNC: 10 U/L
GLUCOSE SERPL-MCNC: 94 MG/DL
HBA1C MFR BLD HPLC: 6.3 %
HCT VFR BLD CALC: 27.5 %
HDLC SERPL-MCNC: 90 MG/DL
HGB BLD-MCNC: 8.5 G/DL
IMM GRANULOCYTES NFR BLD AUTO: 0.2 %
IRON SATN MFR SERPL: 9 %
IRON SERPL-MCNC: 40 UG/DL
LDLC SERPL CALC-MCNC: 97 MG/DL
LYMPHOCYTES # BLD AUTO: 1.77 K/UL
LYMPHOCYTES NFR BLD AUTO: 27.5 %
MAN DIFF?: NORMAL
MCHC RBC-ENTMCNC: 25.1 PG
MCHC RBC-ENTMCNC: 30.9 GM/DL
MCV RBC AUTO: 81.4 FL
MONOCYTES # BLD AUTO: 0.51 K/UL
MONOCYTES NFR BLD AUTO: 7.9 %
NEUTROPHILS # BLD AUTO: 3.71 K/UL
NEUTROPHILS NFR BLD AUTO: 57.6 %
NONHDLC SERPL-MCNC: 106 MG/DL
PLATELET # BLD AUTO: 353 K/UL
POTASSIUM SERPL-SCNC: 3.9 MMOL/L
PROT SERPL-MCNC: 7.2 G/DL
RBC # BLD: 3.38 M/UL
RBC # FLD: 18.9 %
SODIUM SERPL-SCNC: 143 MMOL/L
TIBC SERPL-MCNC: 434 UG/DL
TRIGL SERPL-MCNC: 47 MG/DL
UIBC SERPL-MCNC: 394 UG/DL
VIT B12 SERPL-MCNC: 1928 PG/ML
WBC # FLD AUTO: 6.44 K/UL

## 2024-07-18 ENCOUNTER — OFFICE (OUTPATIENT)
Dept: URBAN - METROPOLITAN AREA CLINIC 28 | Facility: CLINIC | Age: 69
Setting detail: OPHTHALMOLOGY
End: 2024-07-18
Payer: MEDICARE

## 2024-07-18 DIAGNOSIS — H04.123: ICD-10-CM

## 2024-07-18 DIAGNOSIS — H25.13: ICD-10-CM

## 2024-07-18 DIAGNOSIS — H40.1122: ICD-10-CM

## 2024-07-18 DIAGNOSIS — H40.1111: ICD-10-CM

## 2024-07-18 PROCEDURE — 92083 EXTENDED VISUAL FIELD XM: CPT | Performed by: OPHTHALMOLOGY

## 2024-07-18 PROCEDURE — 92133 CPTRZD OPH DX IMG PST SGM ON: CPT | Performed by: OPHTHALMOLOGY

## 2024-07-18 PROCEDURE — 92014 COMPRE OPH EXAM EST PT 1/>: CPT | Performed by: OPHTHALMOLOGY

## 2024-07-18 ASSESSMENT — LID EXAM ASSESSMENTS
OD_MEIBOMITIS: T
OS_MEIBOMITIS: T

## 2024-07-18 ASSESSMENT — CONFRONTATIONAL VISUAL FIELD TEST (CVF)
OS_FINDINGS: FULL
OD_FINDINGS: FULL

## 2024-10-22 ENCOUNTER — APPOINTMENT (OUTPATIENT)
Dept: INTERNAL MEDICINE | Facility: CLINIC | Age: 69
End: 2024-10-22

## 2024-11-05 ENCOUNTER — APPOINTMENT (OUTPATIENT)
Dept: INTERNAL MEDICINE | Facility: CLINIC | Age: 69
End: 2024-11-05
Payer: MEDICARE

## 2024-11-05 VITALS
HEIGHT: 66 IN | RESPIRATION RATE: 16 BRPM | TEMPERATURE: 97.6 F | SYSTOLIC BLOOD PRESSURE: 111 MMHG | HEART RATE: 82 BPM | DIASTOLIC BLOOD PRESSURE: 72 MMHG | WEIGHT: 200 LBS | BODY MASS INDEX: 32.14 KG/M2 | OXYGEN SATURATION: 100 %

## 2024-11-05 DIAGNOSIS — I10 ESSENTIAL (PRIMARY) HYPERTENSION: ICD-10-CM

## 2024-11-05 DIAGNOSIS — R10.10 UPPER ABDOMINAL PAIN, UNSPECIFIED: ICD-10-CM

## 2024-11-05 DIAGNOSIS — K80.50 CALCULUS OF BILE DUCT W/OUT CHOLANGITIS OR CHOLECYSTITIS W/OUT OBSTRUCTION: ICD-10-CM

## 2024-11-05 DIAGNOSIS — R19.7 DIARRHEA, UNSPECIFIED: ICD-10-CM

## 2024-11-05 DIAGNOSIS — K76.0 FATTY (CHANGE OF) LIVER, NOT ELSEWHERE CLASSIFIED: ICD-10-CM

## 2024-11-05 DIAGNOSIS — E11.9 TYPE 2 DIABETES MELLITUS W/OUT COMPLICATIONS: ICD-10-CM

## 2024-11-05 PROCEDURE — 99213 OFFICE O/P EST LOW 20 MIN: CPT

## 2024-11-14 ENCOUNTER — EMERGENCY (EMERGENCY)
Facility: HOSPITAL | Age: 69
LOS: 1 days | Discharge: HOME | End: 2024-11-14
Attending: EMERGENCY MEDICINE
Payer: COMMERCIAL

## 2024-11-14 VITALS
RESPIRATION RATE: 16 BRPM | WEIGHT: 192.9 LBS | DIASTOLIC BLOOD PRESSURE: 79 MMHG | HEART RATE: 87 BPM | SYSTOLIC BLOOD PRESSURE: 139 MMHG | OXYGEN SATURATION: 98 % | TEMPERATURE: 98 F

## 2024-11-14 LAB
ALBUMIN SERPL ELPH-MCNC: 3.8 G/DL — SIGNIFICANT CHANGE UP (ref 3.5–5)
ALP SERPL-CCNC: 67 U/L — SIGNIFICANT CHANGE UP (ref 40–120)
ALT FLD-CCNC: 18 U/L DA — SIGNIFICANT CHANGE UP (ref 10–60)
ANION GAP SERPL CALC-SCNC: 5 MMOL/L — SIGNIFICANT CHANGE UP (ref 5–17)
APPEARANCE UR: ABNORMAL
AST SERPL-CCNC: 20 U/L — SIGNIFICANT CHANGE UP (ref 10–40)
BASOPHILS # BLD AUTO: 0.02 K/UL — SIGNIFICANT CHANGE UP (ref 0–0.2)
BASOPHILS NFR BLD AUTO: 0.2 % — SIGNIFICANT CHANGE UP (ref 0–2)
BILIRUB SERPL-MCNC: 0.2 MG/DL — SIGNIFICANT CHANGE UP (ref 0.2–1.2)
BILIRUB UR-MCNC: NEGATIVE — SIGNIFICANT CHANGE UP
BUN SERPL-MCNC: 32 MG/DL — HIGH (ref 7–18)
CALCIUM SERPL-MCNC: 10.1 MG/DL — SIGNIFICANT CHANGE UP (ref 8.4–10.5)
CHLORIDE SERPL-SCNC: 107 MMOL/L — SIGNIFICANT CHANGE UP (ref 96–108)
CO2 SERPL-SCNC: 26 MMOL/L — SIGNIFICANT CHANGE UP (ref 22–31)
COLOR SPEC: YELLOW — SIGNIFICANT CHANGE UP
CREAT SERPL-MCNC: 1.61 MG/DL — HIGH (ref 0.5–1.3)
DIFF PNL FLD: NEGATIVE — SIGNIFICANT CHANGE UP
EGFR: 35 ML/MIN/1.73M2 — LOW
EOSINOPHIL # BLD AUTO: 0.16 K/UL — SIGNIFICANT CHANGE UP (ref 0–0.5)
EOSINOPHIL NFR BLD AUTO: 1.7 % — SIGNIFICANT CHANGE UP (ref 0–6)
GLUCOSE SERPL-MCNC: 89 MG/DL — SIGNIFICANT CHANGE UP (ref 70–99)
GLUCOSE UR QL: NEGATIVE MG/DL — SIGNIFICANT CHANGE UP
HCT VFR BLD CALC: 31.6 % — LOW (ref 34.5–45)
HGB BLD-MCNC: 10.4 G/DL — LOW (ref 11.5–15.5)
IMM GRANULOCYTES NFR BLD AUTO: 0.2 % — SIGNIFICANT CHANGE UP (ref 0–0.9)
KETONES UR-MCNC: ABNORMAL MG/DL
LEUKOCYTE ESTERASE UR-ACNC: ABNORMAL
LIDOCAIN IGE QN: 53 U/L — SIGNIFICANT CHANGE UP (ref 13–75)
LYMPHOCYTES # BLD AUTO: 2.37 K/UL — SIGNIFICANT CHANGE UP (ref 1–3.3)
LYMPHOCYTES # BLD AUTO: 25.1 % — SIGNIFICANT CHANGE UP (ref 13–44)
MCHC RBC-ENTMCNC: 27 PG — SIGNIFICANT CHANGE UP (ref 27–34)
MCHC RBC-ENTMCNC: 32.9 G/DL — SIGNIFICANT CHANGE UP (ref 32–36)
MCV RBC AUTO: 82.1 FL — SIGNIFICANT CHANGE UP (ref 80–100)
MONOCYTES # BLD AUTO: 0.71 K/UL — SIGNIFICANT CHANGE UP (ref 0–0.9)
MONOCYTES NFR BLD AUTO: 7.5 % — SIGNIFICANT CHANGE UP (ref 2–14)
NEUTROPHILS # BLD AUTO: 6.15 K/UL — SIGNIFICANT CHANGE UP (ref 1.8–7.4)
NEUTROPHILS NFR BLD AUTO: 65.3 % — SIGNIFICANT CHANGE UP (ref 43–77)
NITRITE UR-MCNC: NEGATIVE — SIGNIFICANT CHANGE UP
NRBC # BLD: 0 /100 WBCS — SIGNIFICANT CHANGE UP (ref 0–0)
PH UR: 5 — SIGNIFICANT CHANGE UP (ref 5–8)
PLATELET # BLD AUTO: 322 K/UL — SIGNIFICANT CHANGE UP (ref 150–400)
POTASSIUM SERPL-MCNC: 4 MMOL/L — SIGNIFICANT CHANGE UP (ref 3.5–5.3)
POTASSIUM SERPL-SCNC: 4 MMOL/L — SIGNIFICANT CHANGE UP (ref 3.5–5.3)
PROT SERPL-MCNC: 8.3 G/DL — SIGNIFICANT CHANGE UP (ref 6–8.3)
PROT UR-MCNC: ABNORMAL MG/DL
RBC # BLD: 3.85 M/UL — SIGNIFICANT CHANGE UP (ref 3.8–5.2)
RBC # FLD: 18.5 % — HIGH (ref 10.3–14.5)
SODIUM SERPL-SCNC: 138 MMOL/L — SIGNIFICANT CHANGE UP (ref 135–145)
SP GR SPEC: 1.01 — SIGNIFICANT CHANGE UP (ref 1–1.03)
UROBILINOGEN FLD QL: 0.2 MG/DL — SIGNIFICANT CHANGE UP (ref 0.2–1)
WBC # BLD: 9.43 K/UL — SIGNIFICANT CHANGE UP (ref 3.8–10.5)
WBC # FLD AUTO: 9.43 K/UL — SIGNIFICANT CHANGE UP (ref 3.8–10.5)

## 2024-11-14 PROCEDURE — 76705 ECHO EXAM OF ABDOMEN: CPT | Mod: 26

## 2024-11-14 PROCEDURE — 76705 ECHO EXAM OF ABDOMEN: CPT

## 2024-11-14 PROCEDURE — 85025 COMPLETE CBC W/AUTO DIFF WBC: CPT

## 2024-11-14 PROCEDURE — 80053 COMPREHEN METABOLIC PANEL: CPT

## 2024-11-14 PROCEDURE — 81001 URINALYSIS AUTO W/SCOPE: CPT

## 2024-11-14 PROCEDURE — 36415 COLL VENOUS BLD VENIPUNCTURE: CPT

## 2024-11-14 PROCEDURE — 83690 ASSAY OF LIPASE: CPT

## 2024-11-14 PROCEDURE — 99284 EMERGENCY DEPT VISIT MOD MDM: CPT

## 2024-11-14 PROCEDURE — 99284 EMERGENCY DEPT VISIT MOD MDM: CPT | Mod: 25

## 2024-11-14 NOTE — ED PROVIDER NOTE - CARE PROVIDER_API CALL
Hoda Adames  Surgery  [address], [address]  [place] [address]  Phone: [phone]  Fax: [phone]  Follow Up Time: 4-6 Days

## 2024-11-14 NOTE — ED PROVIDER NOTE - CLINICAL SUMMARY MEDICAL DECISION MAKING FREE TEXT BOX
Ddx: Cholelithiasis vs biliary colic/ no evidence of cholecystitis  Plan: Cbc, cmp, lipase, US, pt declines pain meds, reassess

## 2024-11-14 NOTE — ED PROVIDER NOTE - PATIENT PORTAL LINK FT
You can access the FollowMyHealth Patient Portal offered by [facility] by registering at the following website: [email or web address]. By joining [employer]’s FollowMyHealth portal, you will also be able to view your health information using other applications (apps) compatible with our system.

## 2024-11-14 NOTE — ED ADULT NURSE NOTE - CAS DISCH CONDITION
The patient will be going to hospice as soon as a bed is available. Stable High Dose Vitamin A Counseling: Side effects reviewed, pt to contact office should one occur.

## 2024-11-14 NOTE — ED ADULT NURSE NOTE - OBJECTIVE STATEMENT
Patient is c/o on and off abd pain and diarrhea x 1 month. Patient reports having gall stones and fatty liver. Patient denies chest, No SOB, No N/V.

## 2024-11-14 NOTE — ED PROVIDER NOTE - PROGRESS NOTE DETAILS
Patient remains pain-free in the ED.  Labs within normal limits, ultrasound just shows gallstones, with no evidence of acute cholecystitis.  Patient informed of results, referred to surgery for follow-up, and return precaution provided.  Patient is eager for discharge.

## 2024-11-14 NOTE — ED PROVIDER NOTE - OBJECTIVE STATEMENT
Patient is a 69-year-old lady with a past medical history of hypertension, hyperlipidemia, diabetes and recent finding of gallstones on ultrasound.  Patient says she has been having intermittent abdominal pain is worse after eating for the past 1 and half months.  Patient had an ultrasound last week, which showed gallstones.  No evidence of cholecystitis.  Patient is comfortable now, no fever, and abdominal pain is diffuse, not localized to the right upper quadrant.

## 2024-11-21 ENCOUNTER — RX ONLY (RX ONLY)
Age: 69
End: 2024-11-21

## 2024-11-21 ENCOUNTER — OFFICE (OUTPATIENT)
Dept: URBAN - METROPOLITAN AREA CLINIC 28 | Facility: CLINIC | Age: 69
Setting detail: OPHTHALMOLOGY
End: 2024-11-21
Payer: MEDICARE

## 2024-11-21 DIAGNOSIS — H25.13: ICD-10-CM

## 2024-11-21 DIAGNOSIS — H52.4: ICD-10-CM

## 2024-11-21 DIAGNOSIS — H40.1122: ICD-10-CM

## 2024-11-21 DIAGNOSIS — H04.121: ICD-10-CM

## 2024-11-21 DIAGNOSIS — H11.042: ICD-10-CM

## 2024-11-21 DIAGNOSIS — H04.122: ICD-10-CM

## 2024-11-21 DIAGNOSIS — H04.123: ICD-10-CM

## 2024-11-21 DIAGNOSIS — H16.221: ICD-10-CM

## 2024-11-21 DIAGNOSIS — H40.1111: ICD-10-CM

## 2024-11-21 DIAGNOSIS — H16.222: ICD-10-CM

## 2024-11-21 DIAGNOSIS — H16.223: ICD-10-CM

## 2024-11-21 PROCEDURE — 92015 DETERMINE REFRACTIVE STATE: CPT | Performed by: OPHTHALMOLOGY

## 2024-11-21 PROCEDURE — 92012 INTRM OPH EXAM EST PATIENT: CPT | Performed by: OPHTHALMOLOGY

## 2024-11-21 PROCEDURE — 83861 MICROFLUID ANALY TEARS: CPT | Mod: RT | Performed by: OPHTHALMOLOGY

## 2024-11-21 PROCEDURE — 83861 MICROFLUID ANALY TEARS: CPT | Mod: LT | Performed by: OPHTHALMOLOGY

## 2024-11-21 ASSESSMENT — REFRACTION_MANIFEST
OD_AXIS: 100
OS_ADD: +2.50
OD_AXIS: 100
OS_SPHERE: -0.75
OS_AXIS: 080
OS_ADD: +2.50
OD_VA1: 20/25
OS_CYLINDER: -1.50
OD_SPHERE: +1.50
OD_VA1: 20/30
OD_VA1: 20/25+
OD_ADD: +2.50
OD_CYLINDER: -0.50
OS_CYLINDER: -1.00
OS_VA1: 20/30+
OD_CYLINDER: -1.00
OU_VA: 20/25-2
OD_SPHERE: +1.50
OD_ADD: +2.50
OD_SPHERE: +0.50
OD_AXIS: 090
OS_SPHERE: -0.25
OS_VA1: 20/30
OS_AXIS: 090
OD_CYLINDER: -1.00

## 2024-11-21 ASSESSMENT — PACHYMETRY
OS_CT_UM: 528
OS_CT_CORRECTION: 1
OD_CT_CORRECTION: 1
OD_CT_UM: 523

## 2024-11-21 ASSESSMENT — VISUAL ACUITY
OD_BCVA: 20/40
OS_BCVA: 20/30

## 2024-11-21 ASSESSMENT — LID EXAM ASSESSMENTS
OD_MEIBOMITIS: T
OS_MEIBOMITIS: T

## 2024-11-21 ASSESSMENT — REFRACTION_CURRENTRX
OS_CYLINDER: -1.00
OS_ADD: +2.50
OD_OVR_VA: 20/
OS_ADD: +2.50
OS_CYLINDER: 0.00
OS_SPHERE: -0.25
OD_VPRISM_DIRECTION: SV
OS_AXIS: 090
OS_SPHERE: +2.75
OD_ADD: +2.50
OS_OVR_VA: 20/
OS_AXIS: 180
OD_VPRISM_DIRECTION: PROGS
OD_SPHERE: +1.50
OD_CYLINDER: 0.00
OS_VPRISM_DIRECTION: SV
OD_OVR_VA: 20/
OD_CYLINDER: -1.00
OD_AXIS: 180
OD_AXIS: 100
OS_OVR_VA: 20/
OS_VPRISM_DIRECTION: PROGS
OD_ADD: +2.50
OD_SPHERE: +2.75

## 2024-11-21 ASSESSMENT — REFRACTION_AUTOREFRACTION
OS_SPHERE: 0.00
OS_CYLINDER: -1.75
OD_AXIS: 087
OD_CYLINDER: -1.00
OS_AXIS: 078
OD_SPHERE: +1.00

## 2024-11-21 ASSESSMENT — CONFRONTATIONAL VISUAL FIELD TEST (CVF)
OD_FINDINGS: FULL
OS_FINDINGS: FULL

## 2024-11-21 ASSESSMENT — TEAR BREAK UP TIME (TBUT)
OS_TBUT: 1+
OD_TBUT: 1+

## 2024-11-21 ASSESSMENT — KERATOMETRY
OD_K1POWER_DIOPTERS: 41.25
OS_K2POWER_DIOPTERS: 42.25
OS_K1POWER_DIOPTERS: 40.75
OS_AXISANGLE_DEGREES: 159
OD_AXISANGLE_DEGREES: 003
OD_K2POWER_DIOPTERS: 41.50

## 2024-11-21 ASSESSMENT — TONOMETRY
OD_IOP_MMHG: 11
OS_IOP_MMHG: 13

## 2024-11-21 ASSESSMENT — CORNEAL SURGICAL SCARRING: OD_SCARRING: ANTERIOR STROMAL

## 2024-11-21 ASSESSMENT — CORNEAL PTERYGIUM: OS_PTERYGIUM: NASAL 2MM 1MM

## 2024-11-21 ASSESSMENT — SUPERFICIAL PUNCTATE KERATITIS (SPK)
OS_SPK: T
OD_SPK: 1+

## 2024-12-10 ENCOUNTER — APPOINTMENT (OUTPATIENT)
Dept: CARE COORDINATION | Facility: HOME HEALTH | Age: 69
End: 2024-12-10

## 2024-12-17 ENCOUNTER — APPOINTMENT (OUTPATIENT)
Dept: CARE COORDINATION | Facility: HOME HEALTH | Age: 69
End: 2024-12-17
Payer: MEDICARE

## 2024-12-17 VITALS — BODY MASS INDEX: 31.82 KG/M2 | WEIGHT: 198 LBS | HEIGHT: 66 IN

## 2024-12-17 DIAGNOSIS — E11.9 TYPE 2 DIABETES MELLITUS W/OUT COMPLICATIONS: ICD-10-CM

## 2024-12-17 DIAGNOSIS — R10.10 UPPER ABDOMINAL PAIN, UNSPECIFIED: ICD-10-CM

## 2024-12-17 DIAGNOSIS — E78.00 PURE HYPERCHOLESTEROLEMIA, UNSPECIFIED: ICD-10-CM

## 2024-12-17 DIAGNOSIS — I10 ESSENTIAL (PRIMARY) HYPERTENSION: ICD-10-CM

## 2024-12-17 PROCEDURE — 99348 HOME/RES VST EST LOW MDM 30: CPT | Mod: 95

## 2024-12-19 RX ORDER — DICYCLOMINE HYDROCHLORIDE 20 MG/1
20 TABLET ORAL
Refills: 0 | Status: ACTIVE | COMMUNITY

## 2024-12-19 RX ORDER — FAMOTIDINE 40 MG/1
40 TABLET, FILM COATED ORAL
Refills: 0 | Status: ACTIVE | COMMUNITY

## 2024-12-27 ENCOUNTER — APPOINTMENT (OUTPATIENT)
Dept: CT IMAGING | Facility: CLINIC | Age: 69
End: 2024-12-27

## 2025-01-04 ENCOUNTER — NON-APPOINTMENT (OUTPATIENT)
Age: 70
End: 2025-01-04

## 2025-01-04 ENCOUNTER — APPOINTMENT (OUTPATIENT)
Dept: INTERNAL MEDICINE | Facility: CLINIC | Age: 70
End: 2025-01-04
Payer: MEDICARE

## 2025-01-04 VITALS
TEMPERATURE: 97.8 F | SYSTOLIC BLOOD PRESSURE: 144 MMHG | DIASTOLIC BLOOD PRESSURE: 85 MMHG | HEIGHT: 66 IN | BODY MASS INDEX: 31.82 KG/M2 | RESPIRATION RATE: 16 BRPM | WEIGHT: 198 LBS | HEART RATE: 82 BPM | OXYGEN SATURATION: 100 %

## 2025-01-04 DIAGNOSIS — I10 ESSENTIAL (PRIMARY) HYPERTENSION: ICD-10-CM

## 2025-01-04 DIAGNOSIS — E11.9 TYPE 2 DIABETES MELLITUS W/OUT COMPLICATIONS: ICD-10-CM

## 2025-01-04 DIAGNOSIS — D57.3 SICKLE-CELL TRAIT: ICD-10-CM

## 2025-01-04 DIAGNOSIS — E78.00 PURE HYPERCHOLESTEROLEMIA, UNSPECIFIED: ICD-10-CM

## 2025-01-04 PROCEDURE — 99213 OFFICE O/P EST LOW 20 MIN: CPT | Mod: 25

## 2025-01-06 LAB
ALBUMIN SERPL ELPH-MCNC: 4.6 G/DL
ALP BLD-CCNC: 76 U/L
ALT SERPL-CCNC: 12 U/L
ANION GAP SERPL CALC-SCNC: 15 MMOL/L
APPEARANCE: CLEAR
AST SERPL-CCNC: 22 U/L
BACTERIA: ABNORMAL /HPF
BASOPHILS # BLD AUTO: 0.05 K/UL
BASOPHILS NFR BLD AUTO: 0.8 %
BILIRUB SERPL-MCNC: 0.2 MG/DL
BILIRUBIN URINE: NEGATIVE
BLOOD URINE: NEGATIVE
BUN SERPL-MCNC: 29 MG/DL
CALCIUM SERPL-MCNC: 10 MG/DL
CAST: 3 /LPF
CHLORIDE SERPL-SCNC: 102 MMOL/L
CHOLEST SERPL-MCNC: 212 MG/DL
CO2 SERPL-SCNC: 27 MMOL/L
COLOR: YELLOW
CREAT SERPL-MCNC: 1.31 MG/DL
CREAT SPEC-SCNC: 120 MG/DL
EGFR: 45 ML/MIN/1.73M2
EOSINOPHIL # BLD AUTO: 0.19 K/UL
EOSINOPHIL NFR BLD AUTO: 3.2 %
EPITHELIAL CELLS: 15 /HPF
ESTIMATED AVERAGE GLUCOSE: 134 MG/DL
GGT SERPL-CCNC: 18 U/L
GLUCOSE QUALITATIVE U: NEGATIVE MG/DL
GLUCOSE SERPL-MCNC: 94 MG/DL
HBA1C MFR BLD HPLC: 6.3 %
HCT VFR BLD CALC: 29.5 %
HDLC SERPL-MCNC: 99 MG/DL
HGB BLD-MCNC: 9.3 G/DL
IMM GRANULOCYTES NFR BLD AUTO: 0.2 %
KETONES URINE: NEGATIVE MG/DL
LDLC SERPL CALC-MCNC: 105 MG/DL
LEUKOCYTE ESTERASE URINE: ABNORMAL
LYMPHOCYTES # BLD AUTO: 1.97 K/UL
LYMPHOCYTES NFR BLD AUTO: 32.8 %
MAN DIFF?: NORMAL
MCHC RBC-ENTMCNC: 26.8 PG
MCHC RBC-ENTMCNC: 31.5 G/DL
MCV RBC AUTO: 85 FL
MICROALBUMIN 24H UR DL<=1MG/L-MCNC: 8.8 MG/DL
MICROALBUMIN/CREAT 24H UR-RTO: 73 MG/G
MICROSCOPIC-UA: NORMAL
MONOCYTES # BLD AUTO: 0.46 K/UL
MONOCYTES NFR BLD AUTO: 7.7 %
NEUTROPHILS # BLD AUTO: 3.33 K/UL
NEUTROPHILS NFR BLD AUTO: 55.3 %
NITRITE URINE: NEGATIVE
NONHDLC SERPL-MCNC: 113 MG/DL
PH URINE: 5.5
PLATELET # BLD AUTO: 317 K/UL
POTASSIUM SERPL-SCNC: 4.3 MMOL/L
PROT SERPL-MCNC: 7.5 G/DL
PROTEIN URINE: NORMAL MG/DL
RBC # BLD: 3.47 M/UL
RBC # FLD: 18.2 %
RED BLOOD CELLS URINE: 0 /HPF
SODIUM SERPL-SCNC: 143 MMOL/L
SPECIFIC GRAVITY URINE: 1.01
TRIGL SERPL-MCNC: 46 MG/DL
TSH SERPL-ACNC: 1.01 UIU/ML
UROBILINOGEN URINE: 0.2 MG/DL
WBC # FLD AUTO: 6.01 K/UL
WHITE BLOOD CELLS URINE: 6 /HPF

## 2025-01-14 ENCOUNTER — APPOINTMENT (OUTPATIENT)
Dept: CT IMAGING | Facility: CLINIC | Age: 70
End: 2025-01-14
Payer: MEDICARE

## 2025-01-14 PROCEDURE — 74177 CT ABD & PELVIS W/CONTRAST: CPT

## 2025-02-26 ENCOUNTER — OFFICE (OUTPATIENT)
Dept: URBAN - METROPOLITAN AREA CLINIC 28 | Facility: CLINIC | Age: 70
Setting detail: OPHTHALMOLOGY
End: 2025-02-26
Payer: MEDICARE

## 2025-02-26 DIAGNOSIS — H16.223: ICD-10-CM

## 2025-02-26 DIAGNOSIS — H25.13: ICD-10-CM

## 2025-02-26 DIAGNOSIS — H40.1122: ICD-10-CM

## 2025-02-26 DIAGNOSIS — H40.1111: ICD-10-CM

## 2025-02-26 PROBLEM — H04.123 DRY EYE SYNDROME LACRIMAL GLAND; RIGHT EYE, LEFT EYE, BOTH EYES: Status: ACTIVE | Noted: 2025-02-26

## 2025-02-26 PROBLEM — H04.122 DRY EYE SYNDROME LACRIMAL GLAND; RIGHT EYE, LEFT EYE, BOTH EYES: Status: ACTIVE | Noted: 2025-02-26

## 2025-02-26 PROBLEM — H04.121 DRY EYE SYNDROME LACRIMAL GLAND; RIGHT EYE, LEFT EYE, BOTH EYES: Status: ACTIVE | Noted: 2025-02-26

## 2025-02-26 PROCEDURE — 92133 CPTRZD OPH DX IMG PST SGM ON: CPT | Performed by: OPHTHALMOLOGY

## 2025-02-26 PROCEDURE — 92014 COMPRE OPH EXAM EST PT 1/>: CPT | Performed by: OPHTHALMOLOGY

## 2025-02-26 ASSESSMENT — PACHYMETRY
OD_CT_CORRECTION: 1
OS_CT_CORRECTION: 1
OS_CT_UM: 528
OD_CT_UM: 523

## 2025-02-26 ASSESSMENT — REFRACTION_CURRENTRX
OD_ADD: +2.50
OD_OVR_VA: 20/
OS_SPHERE: +2.75
OS_AXIS: 090
OS_AXIS: 180
OD_CYLINDER: -1.00
OD_OVR_VA: 20/
OS_CYLINDER: 0.00
OD_VPRISM_DIRECTION: PROGS
OS_SPHERE: -0.25
OD_AXIS: 100
OD_CYLINDER: 0.00
OD_ADD: +2.50
OS_OVR_VA: 20/
OS_ADD: +2.50
OS_ADD: +2.50
OS_VPRISM_DIRECTION: SV
OD_SPHERE: +1.50
OD_AXIS: 180
OS_OVR_VA: 20/
OD_VPRISM_DIRECTION: SV
OS_VPRISM_DIRECTION: PROGS
OD_SPHERE: +2.75
OS_CYLINDER: -1.00

## 2025-02-26 ASSESSMENT — REFRACTION_MANIFEST
OS_SPHERE: -0.25
OS_CYLINDER: -1.00
OD_ADD: +2.50
OS_CYLINDER: -1.50
OD_SPHERE: +0.50
OD_SPHERE: +1.50
OD_VA1: 20/25+
OD_CYLINDER: -0.50
OD_AXIS: 100
OS_AXIS: 080
OD_VA1: 20/30
OD_VA1: 20/25
OS_ADD: +2.50
OU_VA: 20/25-2
OD_CYLINDER: -1.00
OD_ADD: +2.50
OD_CYLINDER: -1.00
OS_VA1: 20/30+
OD_AXIS: 100
OD_AXIS: 090
OS_VA1: 20/30
OS_SPHERE: -0.75
OS_AXIS: 090
OS_ADD: +2.50
OD_SPHERE: +1.50

## 2025-02-26 ASSESSMENT — TEAR BREAK UP TIME (TBUT)
OS_TBUT: 1+
OD_TBUT: 1+

## 2025-02-26 ASSESSMENT — CONFRONTATIONAL VISUAL FIELD TEST (CVF)
OD_FINDINGS: FULL
OS_FINDINGS: FULL

## 2025-02-26 ASSESSMENT — REFRACTION_AUTOREFRACTION
OD_AXIS: 098
OS_SPHERE: -0.50
OS_CYLINDER: -1.00
OD_SPHERE: +0.75
OD_CYLINDER: -1.00
OS_AXIS: 080

## 2025-02-26 ASSESSMENT — KERATOMETRY
OS_AXISANGLE_DEGREES: 156
OD_K1POWER_DIOPTERS: 41.00
OD_K2POWER_DIOPTERS: 41.50
OS_K1POWER_DIOPTERS: 41.50
OD_AXISANGLE_DEGREES: 012
OS_K2POWER_DIOPTERS: 42.00

## 2025-02-26 ASSESSMENT — VISUAL ACUITY
OD_BCVA: 20/25-2
OS_BCVA: 20/30

## 2025-02-26 ASSESSMENT — SUPERFICIAL PUNCTATE KERATITIS (SPK)
OS_SPK: ABSENT
OD_SPK: ABSENT

## 2025-02-26 ASSESSMENT — LID EXAM ASSESSMENTS
OS_MEIBOMITIS: T
OD_MEIBOMITIS: T

## 2025-02-26 ASSESSMENT — CORNEAL SURGICAL SCARRING: OD_SCARRING: ANTERIOR STROMAL

## 2025-02-26 ASSESSMENT — TONOMETRY
OS_IOP_MMHG: 16
OD_IOP_MMHG: 13

## 2025-02-26 ASSESSMENT — CORNEAL PTERYGIUM: OS_PTERYGIUM: NASAL 2MM 1MM

## 2025-05-01 ENCOUNTER — APPOINTMENT (OUTPATIENT)
Dept: INTERNAL MEDICINE | Facility: CLINIC | Age: 70
End: 2025-05-01

## 2025-05-05 ENCOUNTER — NON-APPOINTMENT (OUTPATIENT)
Age: 70
End: 2025-05-05